# Patient Record
Sex: FEMALE | Race: WHITE | URBAN - METROPOLITAN AREA
[De-identification: names, ages, dates, MRNs, and addresses within clinical notes are randomized per-mention and may not be internally consistent; named-entity substitution may affect disease eponyms.]

---

## 2017-08-11 ENCOUNTER — DOCTOR'S OFFICE (OUTPATIENT)
Dept: URBAN - METROPOLITAN AREA CLINIC 125 | Facility: CLINIC | Age: 72
Setting detail: OPHTHALMOLOGY
End: 2017-08-11
Payer: COMMERCIAL

## 2017-08-11 DIAGNOSIS — E11.3413: ICD-10-CM

## 2017-08-11 PROCEDURE — 92134 CPTRZ OPH DX IMG PST SGM RTA: CPT | Performed by: OPHTHALMOLOGY

## 2017-08-11 PROCEDURE — 92014 COMPRE OPH EXAM EST PT 1/>: CPT | Performed by: OPHTHALMOLOGY

## 2017-08-11 ASSESSMENT — LID EXAM ASSESSMENTS
OS_COMMENTS: WOUND HEALED
OD_EDEMA: ABSENT

## 2017-08-11 ASSESSMENT — REFRACTION_MANIFEST
OD_VA3: 20/
OD_VA1: 20/
OD_VA2: 20/
OS_VA3: 20/
OS_VA2: 20/
OS_VA3: 20/
OD_VA1: 20/
OD_VA3: 20/
OS_VA2: 20/
OS_VA2: 20/
OS_VA3: 20/
OD_VA2: 20/
OS_VA1: 20/
OD_VA2: 20/
OD_VA3: 20/
OS_VA1: 20/
OD_VA1: 20/
OS_VA1: 20/
OU_VA: 20/

## 2017-08-11 ASSESSMENT — REFRACTION_CURRENTRX
OS_OVR_VA: 20/
OD_OVR_VA: 20/
OS_OVR_VA: 20/
OS_OVR_VA: 20/
OD_OVR_VA: 20/
OD_OVR_VA: 20/

## 2017-08-11 ASSESSMENT — REFRACTION_AUTOREFRACTION
OD_AXIS: 104
OS_CYLINDER: -2.00
OS_SPHERE: -3.00
OS_AXIS: 094
OD_SPHERE: -1.25
OD_CYLINDER: -2.25

## 2017-08-11 ASSESSMENT — SPHEQUIV_DERIVED
OD_SPHEQUIV: -2.375
OS_SPHEQUIV: -4

## 2017-08-11 ASSESSMENT — KERATOMETRY
OS_K1POWER_DIOPTERS: 43.75
OS_AXISANGLE_DEGREES: 103
OD_AXISANGLE_DEGREES: 106
OD_K1POWER_DIOPTERS: 43.00
OD_K2POWER_DIOPTERS: 45.00
OS_K2POWER_DIOPTERS: 45.00

## 2017-08-11 ASSESSMENT — SUPERFICIAL PUNCTATE KERATITIS (SPK)
OS_SPK: 1+
OD_SPK: 1+

## 2017-08-11 ASSESSMENT — CONFRONTATIONAL VISUAL FIELD TEST (CVF)
OD_FINDINGS: FULL
OS_FINDINGS: FULL

## 2017-08-11 ASSESSMENT — AXIALLENGTH_DERIVED
OD_AL: 24.3576
OS_AL: 24.8974

## 2017-08-11 ASSESSMENT — VISUAL ACUITY
OD_BCVA: 20/40+2
OS_BCVA: 20/50

## 2017-09-20 ENCOUNTER — DOCTOR'S OFFICE (OUTPATIENT)
Dept: URBAN - METROPOLITAN AREA CLINIC 125 | Facility: CLINIC | Age: 72
Setting detail: OPHTHALMOLOGY
End: 2017-09-20
Payer: COMMERCIAL

## 2017-09-20 DIAGNOSIS — H43.811: ICD-10-CM

## 2017-09-20 DIAGNOSIS — H04.123: ICD-10-CM

## 2017-09-20 DIAGNOSIS — E11.3413: ICD-10-CM

## 2017-09-20 DIAGNOSIS — H43.812: ICD-10-CM

## 2017-09-20 DIAGNOSIS — Z79.4: ICD-10-CM

## 2017-09-20 DIAGNOSIS — H10.13: ICD-10-CM

## 2017-09-20 DIAGNOSIS — H43.813: ICD-10-CM

## 2017-09-20 PROCEDURE — 99214 OFFICE O/P EST MOD 30 MIN: CPT | Performed by: OPHTHALMOLOGY

## 2017-09-20 PROCEDURE — 92134 CPTRZ OPH DX IMG PST SGM RTA: CPT | Performed by: OPHTHALMOLOGY

## 2017-09-20 PROCEDURE — 92225 OPHTHALMOSCOPY EXTENDED INITIAL: CPT | Performed by: OPHTHALMOLOGY

## 2017-09-20 ASSESSMENT — REFRACTION_MANIFEST
OU_VA: 20/
OS_VA2: 20/
OD_VA1: 20/
OS_VA1: 20/
OD_VA2: 20/
OS_VA1: 20/
OD_VA1: 20/
OD_VA2: 20/
OS_VA2: 20/
OU_VA: 20/
OD_VA2: 20/
OD_VA3: 20/
OD_VA1: 20/
OS_VA1: 20/
OS_VA3: 20/
OU_VA: 20/
OS_VA3: 20/
OD_VA3: 20/
OS_VA3: 20/
OS_VA2: 20/
OD_VA3: 20/

## 2017-09-20 ASSESSMENT — REFRACTION_CURRENTRX
OD_OVR_VA: 20/
OS_OVR_VA: 20/

## 2017-09-20 ASSESSMENT — LID EXAM ASSESSMENTS
OS_COMMENTS: WOUND HEALED
OD_EDEMA: ABSENT

## 2017-09-20 ASSESSMENT — SUPERFICIAL PUNCTATE KERATITIS (SPK)
OS_SPK: 1+
OD_SPK: 1+

## 2017-09-20 ASSESSMENT — REFRACTION_AUTOREFRACTION
OS_CYLINDER: -2.00
OS_AXIS: 094
OD_AXIS: 104
OD_CYLINDER: -2.25
OS_SPHERE: -3.00
OD_SPHERE: -1.25

## 2017-09-20 ASSESSMENT — SPHEQUIV_DERIVED
OD_SPHEQUIV: -2.375
OS_SPHEQUIV: -4

## 2017-09-20 ASSESSMENT — VISUAL ACUITY
OD_BCVA: 20/50+2
OS_BCVA: 20/30

## 2017-09-20 ASSESSMENT — CONFRONTATIONAL VISUAL FIELD TEST (CVF)
OD_FINDINGS: FULL
OS_FINDINGS: FULL

## 2017-11-29 ENCOUNTER — DOCTOR'S OFFICE (OUTPATIENT)
Dept: URBAN - METROPOLITAN AREA CLINIC 125 | Facility: CLINIC | Age: 72
Setting detail: OPHTHALMOLOGY
End: 2017-11-29
Payer: COMMERCIAL

## 2017-11-29 DIAGNOSIS — H43.811: ICD-10-CM

## 2017-11-29 DIAGNOSIS — E11.3413: ICD-10-CM

## 2017-11-29 DIAGNOSIS — Z79.4: ICD-10-CM

## 2017-11-29 DIAGNOSIS — H04.123: ICD-10-CM

## 2017-11-29 DIAGNOSIS — H43.812: ICD-10-CM

## 2017-11-29 DIAGNOSIS — H43.813: ICD-10-CM

## 2017-11-29 PROCEDURE — 92134 CPTRZ OPH DX IMG PST SGM RTA: CPT | Performed by: OPHTHALMOLOGY

## 2017-11-29 PROCEDURE — 99214 OFFICE O/P EST MOD 30 MIN: CPT | Performed by: OPHTHALMOLOGY

## 2017-11-29 PROCEDURE — 92226 OPHTHALMOSCOPY EXT SUBSEQUENT: CPT | Performed by: OPHTHALMOLOGY

## 2017-11-29 ASSESSMENT — REFRACTION_AUTOREFRACTION
OS_AXIS: 094
OS_CYLINDER: -2.00
OD_AXIS: 104
OD_CYLINDER: -2.25
OS_SPHERE: -3.00
OD_SPHERE: -1.25

## 2017-11-29 ASSESSMENT — REFRACTION_MANIFEST
OD_VA3: 20/
OU_VA: 20/
OD_VA2: 20/
OS_VA3: 20/
OU_VA: 20/
OS_VA1: 20/
OS_VA2: 20/
OS_VA3: 20/
OD_VA3: 20/
OS_VA2: 20/
OD_VA2: 20/
OS_VA3: 20/
OS_VA2: 20/
OS_VA1: 20/
OD_VA2: 20/
OD_VA1: 20/
OD_VA3: 20/
OU_VA: 20/
OS_VA1: 20/

## 2017-11-29 ASSESSMENT — LID EXAM ASSESSMENTS
OD_EDEMA: ABSENT
OS_COMMENTS: WOUND HEALED

## 2017-11-29 ASSESSMENT — SPHEQUIV_DERIVED
OS_SPHEQUIV: -4
OD_SPHEQUIV: -2.375

## 2017-11-29 ASSESSMENT — SUPERFICIAL PUNCTATE KERATITIS (SPK)
OD_SPK: 1+
OS_SPK: 1+

## 2017-11-29 ASSESSMENT — CONFRONTATIONAL VISUAL FIELD TEST (CVF)
OD_FINDINGS: FULL
OS_FINDINGS: FULL

## 2017-11-29 ASSESSMENT — REFRACTION_CURRENTRX
OD_OVR_VA: 20/
OS_OVR_VA: 20/

## 2017-11-29 ASSESSMENT — VISUAL ACUITY
OS_BCVA: 20/40+2
OD_BCVA: 20/50

## 2019-02-08 ENCOUNTER — DOCTOR'S OFFICE (OUTPATIENT)
Dept: URBAN - METROPOLITAN AREA CLINIC 125 | Facility: CLINIC | Age: 74
Setting detail: OPHTHALMOLOGY
End: 2019-02-08
Payer: COMMERCIAL

## 2019-02-08 DIAGNOSIS — H04.123: ICD-10-CM

## 2019-02-08 DIAGNOSIS — E11.3413: ICD-10-CM

## 2019-02-08 DIAGNOSIS — Z79.4: ICD-10-CM

## 2019-02-08 DIAGNOSIS — H43.813: ICD-10-CM

## 2019-02-08 DIAGNOSIS — H43.812: ICD-10-CM

## 2019-02-08 DIAGNOSIS — H43.811: ICD-10-CM

## 2019-02-08 PROCEDURE — 92134 CPTRZ OPH DX IMG PST SGM RTA: CPT | Performed by: OPHTHALMOLOGY

## 2019-02-08 PROCEDURE — 92014 COMPRE OPH EXAM EST PT 1/>: CPT | Performed by: OPHTHALMOLOGY

## 2019-02-08 PROCEDURE — 83861 MICROFLUID ANALY TEARS: CPT | Performed by: OPHTHALMOLOGY

## 2019-02-08 ASSESSMENT — REFRACTION_MANIFEST
OD_VA3: 20/
OS_VA3: 20/
OD_VA1: 20/
OD_VA2: 20/
OS_VA3: 20/
OS_VA2: 20/
OD_VA1: 20/
OU_VA: 20/
OS_VA1: 20/
OU_VA: 20/
OS_VA1: 20/
OS_VA2: 20/
OD_VA3: 20/
OD_VA2: 20/

## 2019-02-08 ASSESSMENT — REFRACTION_CURRENTRX
OD_CYLINDER: -2.25
OD_AXIS: 097
OS_OVR_VA: 20/
OD_OVR_VA: 20/
OD_VPRISM_DIRECTION: PROGS
OD_ADD: +2.50
OD_SPHERE: PLANO
OS_ADD: +2.50
OS_OVR_VA: 20/
OD_OVR_VA: 20/
OD_OVR_VA: 20/
OS_AXIS: 098
OS_OVR_VA: 20/
OS_SPHERE: -1.00
OS_CYLINDER: -2.50
OS_VPRISM_DIRECTION: PROGS

## 2019-02-08 ASSESSMENT — CONFRONTATIONAL VISUAL FIELD TEST (CVF)
OD_FINDINGS: FULL
OS_FINDINGS: FULL

## 2019-02-08 ASSESSMENT — SUPERFICIAL PUNCTATE KERATITIS (SPK)
OS_SPK: 1+
OD_SPK: 1+

## 2019-02-08 ASSESSMENT — VISUAL ACUITY
OS_BCVA: 20/30+2
OD_BCVA: 20/40-2

## 2019-02-08 ASSESSMENT — LID EXAM ASSESSMENTS
OS_COMMENTS: WOUND HEALED
OD_EDEMA: ABSENT

## 2019-02-08 ASSESSMENT — SPHEQUIV_DERIVED
OS_SPHEQUIV: -4
OD_SPHEQUIV: -2.375

## 2019-02-08 ASSESSMENT — REFRACTION_AUTOREFRACTION
OD_CYLINDER: -2.25
OS_CYLINDER: -2.00
OS_SPHERE: -3.00
OD_AXIS: 104
OD_SPHERE: -1.25
OS_AXIS: 094

## 2019-03-13 ENCOUNTER — DOCTOR'S OFFICE (OUTPATIENT)
Dept: URBAN - METROPOLITAN AREA CLINIC 125 | Facility: CLINIC | Age: 74
Setting detail: OPHTHALMOLOGY
End: 2019-03-13
Payer: COMMERCIAL

## 2019-03-13 DIAGNOSIS — Z79.4: ICD-10-CM

## 2019-03-13 DIAGNOSIS — H04.123: ICD-10-CM

## 2019-03-13 DIAGNOSIS — H43.812: ICD-10-CM

## 2019-03-13 DIAGNOSIS — H43.813: ICD-10-CM

## 2019-03-13 DIAGNOSIS — H43.811: ICD-10-CM

## 2019-03-13 DIAGNOSIS — E11.3413: ICD-10-CM

## 2019-03-13 PROCEDURE — 99214 OFFICE O/P EST MOD 30 MIN: CPT | Performed by: OPHTHALMOLOGY

## 2019-03-13 PROCEDURE — 92226 OPHTHALMOSCOPY EXT SUBSEQUENT: CPT | Performed by: OPHTHALMOLOGY

## 2019-03-13 PROCEDURE — 92235 FLUORESCEIN ANGRPH MLTIFRAME: CPT | Performed by: OPHTHALMOLOGY

## 2019-03-13 PROCEDURE — 92250 FUNDUS PHOTOGRAPHY W/I&R: CPT | Performed by: OPHTHALMOLOGY

## 2019-03-13 ASSESSMENT — REFRACTION_MANIFEST
OD_VA3: 20/
OS_VA1: 20/
OS_VA2: 20/
OS_VA2: 20/
OS_VA3: 20/
OS_VA3: 20/
OD_VA3: 20/
OU_VA: 20/
OD_VA2: 20/
OS_VA1: 20/
OD_VA2: 20/
OD_VA1: 20/
OU_VA: 20/
OD_VA1: 20/

## 2019-03-13 ASSESSMENT — REFRACTION_AUTOREFRACTION
OS_AXIS: 094
OS_SPHERE: -3.00
OD_CYLINDER: -2.25
OD_SPHERE: -1.25
OS_CYLINDER: -2.00
OD_AXIS: 104

## 2019-03-13 ASSESSMENT — CONFRONTATIONAL VISUAL FIELD TEST (CVF)
OS_FINDINGS: FULL
OD_FINDINGS: FULL

## 2019-03-13 ASSESSMENT — REFRACTION_CURRENTRX
OD_SPHERE: PLANO
OD_OVR_VA: 20/
OD_CYLINDER: -2.25
OS_SPHERE: -1.00
OS_OVR_VA: 20/
OS_OVR_VA: 20/
OS_CYLINDER: -2.50
OD_OVR_VA: 20/
OS_AXIS: 098
OD_ADD: +2.50
OD_OVR_VA: 20/
OD_AXIS: 097
OD_VPRISM_DIRECTION: PROGS
OS_OVR_VA: 20/
OS_VPRISM_DIRECTION: PROGS
OS_ADD: +2.50

## 2019-03-13 ASSESSMENT — VISUAL ACUITY
OD_BCVA: 20/40-2
OS_BCVA: 20/30+2

## 2019-03-13 ASSESSMENT — SUPERFICIAL PUNCTATE KERATITIS (SPK)
OD_SPK: 1+
OS_SPK: 1+

## 2019-03-13 ASSESSMENT — LID EXAM ASSESSMENTS
OD_EDEMA: ABSENT
OS_COMMENTS: WOUND HEALED

## 2019-03-13 ASSESSMENT — SPHEQUIV_DERIVED
OD_SPHEQUIV: -2.375
OS_SPHEQUIV: -4

## 2019-03-22 ENCOUNTER — DOCTOR'S OFFICE (OUTPATIENT)
Dept: URBAN - METROPOLITAN AREA CLINIC 125 | Facility: CLINIC | Age: 74
Setting detail: OPHTHALMOLOGY
End: 2019-03-22
Payer: COMMERCIAL

## 2019-03-22 DIAGNOSIS — Z79.4: ICD-10-CM

## 2019-03-22 DIAGNOSIS — E11.3411: ICD-10-CM

## 2019-03-22 PROCEDURE — 67028 INJECTION EYE DRUG: CPT | Performed by: OPHTHALMOLOGY

## 2019-03-22 ASSESSMENT — REFRACTION_AUTOREFRACTION
OD_AXIS: 104
OD_SPHERE: -1.25
OS_SPHERE: -3.00
OD_CYLINDER: -2.25
OS_AXIS: 094
OS_CYLINDER: -2.00

## 2019-03-22 ASSESSMENT — VISUAL ACUITY
OS_BCVA: 20/30+2
OD_BCVA: 20/40-2

## 2019-03-22 ASSESSMENT — SPHEQUIV_DERIVED
OD_SPHEQUIV: -2.375
OS_SPHEQUIV: -4

## 2019-03-27 ENCOUNTER — DOCTOR'S OFFICE (OUTPATIENT)
Dept: URBAN - METROPOLITAN AREA CLINIC 125 | Facility: CLINIC | Age: 74
Setting detail: OPHTHALMOLOGY
End: 2019-03-27
Payer: COMMERCIAL

## 2019-03-27 DIAGNOSIS — E11.3412: ICD-10-CM

## 2019-03-27 PROCEDURE — 67028 INJECTION EYE DRUG: CPT | Performed by: OPHTHALMOLOGY

## 2019-03-27 ASSESSMENT — REFRACTION_AUTOREFRACTION
OS_AXIS: 094
OS_SPHERE: -3.00
OD_SPHERE: -1.25
OS_CYLINDER: -2.00
OD_CYLINDER: -2.25
OD_AXIS: 104

## 2019-03-27 ASSESSMENT — REFRACTION_MANIFEST
OU_VA: 20/
OS_VA2: 20/
OD_VA3: 20/
OD_VA3: 20/
OS_VA3: 20/
OS_VA3: 20/
OS_VA1: 20/
OD_VA1: 20/
OS_VA1: 20/
OD_VA1: 20/
OD_VA2: 20/
OU_VA: 20/
OS_VA2: 20/
OD_VA2: 20/

## 2019-03-27 ASSESSMENT — REFRACTION_CURRENTRX
OS_OVR_VA: 20/
OD_OVR_VA: 20/
OD_OVR_VA: 20/
OS_OVR_VA: 20/
OD_OVR_VA: 20/
OS_OVR_VA: 20/

## 2019-03-27 ASSESSMENT — SPHEQUIV_DERIVED
OD_SPHEQUIV: -2.375
OS_SPHEQUIV: -4

## 2019-03-27 ASSESSMENT — VISUAL ACUITY
OS_BCVA: 20/30+2
OD_BCVA: 20/40-2

## 2019-04-17 ENCOUNTER — DOCTOR'S OFFICE (OUTPATIENT)
Dept: URBAN - METROPOLITAN AREA CLINIC 125 | Facility: CLINIC | Age: 74
Setting detail: OPHTHALMOLOGY
End: 2019-04-17
Payer: COMMERCIAL

## 2019-04-17 DIAGNOSIS — Z79.4: ICD-10-CM

## 2019-04-17 DIAGNOSIS — E11.3413: ICD-10-CM

## 2019-04-17 DIAGNOSIS — H43.813: ICD-10-CM

## 2019-04-17 DIAGNOSIS — H43.811: ICD-10-CM

## 2019-04-17 DIAGNOSIS — H43.812: ICD-10-CM

## 2019-04-17 PROCEDURE — 92134 CPTRZ OPH DX IMG PST SGM RTA: CPT | Performed by: OPHTHALMOLOGY

## 2019-04-17 PROCEDURE — 92226 OPHTHALMOSCOPY EXT SUBSEQUENT: CPT | Performed by: OPHTHALMOLOGY

## 2019-04-17 PROCEDURE — 99214 OFFICE O/P EST MOD 30 MIN: CPT | Performed by: OPHTHALMOLOGY

## 2019-04-17 ASSESSMENT — LID EXAM ASSESSMENTS
OD_EDEMA: ABSENT
OS_COMMENTS: WOUND HEALED

## 2019-04-17 ASSESSMENT — REFRACTION_AUTOREFRACTION
OD_SPHERE: -1.25
OS_SPHERE: -3.00
OD_CYLINDER: -2.25
OS_AXIS: 094
OS_CYLINDER: -2.00
OD_AXIS: 104

## 2019-04-17 ASSESSMENT — REFRACTION_CURRENTRX
OD_OVR_VA: 20/
OS_OVR_VA: 20/
OD_OVR_VA: 20/
OD_OVR_VA: 20/
OS_OVR_VA: 20/
OS_OVR_VA: 20/

## 2019-04-17 ASSESSMENT — REFRACTION_MANIFEST
OD_VA3: 20/
OD_VA1: 20/
OS_VA2: 20/
OS_VA1: 20/
OD_VA2: 20/
OD_VA3: 20/
OU_VA: 20/
OS_VA1: 20/
OS_VA3: 20/
OD_VA2: 20/
OD_VA1: 20/
OS_VA3: 20/
OU_VA: 20/
OS_VA2: 20/

## 2019-04-17 ASSESSMENT — VISUAL ACUITY
OD_BCVA: 20/40
OS_BCVA: 20/25-2

## 2019-04-17 ASSESSMENT — CONFRONTATIONAL VISUAL FIELD TEST (CVF)
OD_FINDINGS: FULL
OS_FINDINGS: FULL

## 2019-04-17 ASSESSMENT — SUPERFICIAL PUNCTATE KERATITIS (SPK)
OD_SPK: 1+
OS_SPK: 1+

## 2019-04-17 ASSESSMENT — SPHEQUIV_DERIVED
OD_SPHEQUIV: -2.375
OS_SPHEQUIV: -4

## 2019-04-24 ENCOUNTER — DOCTOR'S OFFICE (OUTPATIENT)
Dept: URBAN - METROPOLITAN AREA CLINIC 125 | Facility: CLINIC | Age: 74
Setting detail: OPHTHALMOLOGY
End: 2019-04-24
Payer: COMMERCIAL

## 2019-04-24 DIAGNOSIS — E11.3411: ICD-10-CM

## 2019-04-24 PROCEDURE — 67028 INJECTION EYE DRUG: CPT | Performed by: OPHTHALMOLOGY

## 2019-04-24 ASSESSMENT — REFRACTION_MANIFEST
OD_VA3: 20/
OD_VA3: 20/
OS_VA2: 20/
OS_VA2: 20/
OD_VA1: 20/
OS_VA1: 20/
OS_VA3: 20/
OD_VA2: 20/
OS_VA3: 20/
OD_VA2: 20/
OS_VA1: 20/
OU_VA: 20/
OD_VA1: 20/
OU_VA: 20/

## 2019-04-24 ASSESSMENT — VISUAL ACUITY
OD_BCVA: 20/40
OS_BCVA: 20/25-2

## 2019-04-24 ASSESSMENT — REFRACTION_AUTOREFRACTION
OS_CYLINDER: -2.00
OS_SPHERE: -3.00
OD_SPHERE: -1.25
OD_AXIS: 104
OD_CYLINDER: -2.25
OS_AXIS: 094

## 2019-04-24 ASSESSMENT — REFRACTION_CURRENTRX
OD_OVR_VA: 20/
OD_OVR_VA: 20/
OS_OVR_VA: 20/
OD_OVR_VA: 20/
OS_OVR_VA: 20/
OS_OVR_VA: 20/

## 2019-04-24 ASSESSMENT — SPHEQUIV_DERIVED
OD_SPHEQUIV: -2.375
OS_SPHEQUIV: -4

## 2019-04-26 ENCOUNTER — DOCTOR'S OFFICE (OUTPATIENT)
Dept: URBAN - METROPOLITAN AREA CLINIC 125 | Facility: CLINIC | Age: 74
Setting detail: OPHTHALMOLOGY
End: 2019-04-26
Payer: COMMERCIAL

## 2019-04-26 DIAGNOSIS — E11.3412: ICD-10-CM

## 2019-04-26 PROCEDURE — 67028 INJECTION EYE DRUG: CPT | Performed by: OPHTHALMOLOGY

## 2019-04-26 ASSESSMENT — REFRACTION_AUTOREFRACTION
OS_CYLINDER: -2.00
OD_AXIS: 104
OD_CYLINDER: -2.25
OS_AXIS: 094
OD_SPHERE: -1.25
OS_SPHERE: -3.00

## 2019-04-26 ASSESSMENT — REFRACTION_MANIFEST
OS_VA2: 20/
OS_VA3: 20/
OD_VA3: 20/
OD_VA1: 20/
OU_VA: 20/
OD_VA1: 20/
OU_VA: 20/
OS_VA2: 20/
OD_VA2: 20/
OD_VA2: 20/
OS_VA3: 20/
OD_VA3: 20/
OS_VA1: 20/
OS_VA1: 20/

## 2019-04-26 ASSESSMENT — REFRACTION_CURRENTRX
OS_OVR_VA: 20/
OD_OVR_VA: 20/
OD_OVR_VA: 20/
OS_OVR_VA: 20/
OD_OVR_VA: 20/
OS_OVR_VA: 20/

## 2019-04-26 ASSESSMENT — VISUAL ACUITY
OS_BCVA: 20/25-2
OD_BCVA: 20/40

## 2019-04-26 ASSESSMENT — SPHEQUIV_DERIVED
OS_SPHEQUIV: -4
OD_SPHEQUIV: -2.375

## 2019-05-15 ENCOUNTER — DOCTOR'S OFFICE (OUTPATIENT)
Dept: URBAN - METROPOLITAN AREA CLINIC 125 | Facility: CLINIC | Age: 74
Setting detail: OPHTHALMOLOGY
End: 2019-05-15
Payer: COMMERCIAL

## 2019-05-15 DIAGNOSIS — H04.123: ICD-10-CM

## 2019-05-15 DIAGNOSIS — H04.121: ICD-10-CM

## 2019-05-15 DIAGNOSIS — H43.811: ICD-10-CM

## 2019-05-15 DIAGNOSIS — H43.813: ICD-10-CM

## 2019-05-15 DIAGNOSIS — Z79.4: ICD-10-CM

## 2019-05-15 DIAGNOSIS — H04.122: ICD-10-CM

## 2019-05-15 DIAGNOSIS — E11.3413: ICD-10-CM

## 2019-05-15 DIAGNOSIS — H43.812: ICD-10-CM

## 2019-05-15 PROCEDURE — 92014 COMPRE OPH EXAM EST PT 1/>: CPT | Performed by: OPHTHALMOLOGY

## 2019-05-15 PROCEDURE — 83861 MICROFLUID ANALY TEARS: CPT | Performed by: OPHTHALMOLOGY

## 2019-05-15 PROCEDURE — 92134 CPTRZ OPH DX IMG PST SGM RTA: CPT | Performed by: OPHTHALMOLOGY

## 2019-05-15 PROCEDURE — 92226 OPHTHALMOSCOPY EXT SUBSEQUENT: CPT | Performed by: OPHTHALMOLOGY

## 2019-05-15 ASSESSMENT — LID EXAM ASSESSMENTS
OS_COMMENTS: WOUND HEALED
OD_EDEMA: ABSENT

## 2019-05-15 ASSESSMENT — REFRACTION_MANIFEST
OU_VA: 20/
OD_VA2: 20/
OD_VA3: 20/
OD_VA3: 20/
OD_VA1: 20/
OS_VA3: 20/
OS_VA1: 20/
OD_VA1: 20/
OS_VA2: 20/
OS_VA3: 20/
OU_VA: 20/
OS_VA1: 20/
OS_VA2: 20/
OD_VA2: 20/

## 2019-05-15 ASSESSMENT — REFRACTION_CURRENTRX
OD_OVR_VA: 20/
OS_OVR_VA: 20/
OD_OVR_VA: 20/
OD_OVR_VA: 20/

## 2019-05-15 ASSESSMENT — REFRACTION_AUTOREFRACTION
OD_SPHERE: -1.25
OS_SPHERE: -3.00
OD_AXIS: 104
OD_CYLINDER: -2.25
OS_AXIS: 094
OS_CYLINDER: -2.00

## 2019-05-15 ASSESSMENT — SUPERFICIAL PUNCTATE KERATITIS (SPK)
OD_SPK: 1+
OS_SPK: 1+

## 2019-05-15 ASSESSMENT — CONFRONTATIONAL VISUAL FIELD TEST (CVF)
OD_FINDINGS: FULL
OS_FINDINGS: FULL

## 2019-05-15 ASSESSMENT — SPHEQUIV_DERIVED
OD_SPHEQUIV: -2.375
OS_SPHEQUIV: -4

## 2019-05-15 ASSESSMENT — VISUAL ACUITY
OS_BCVA: 20/30+1
OD_BCVA: 20/25-2

## 2019-05-29 ENCOUNTER — DOCTOR'S OFFICE (OUTPATIENT)
Dept: URBAN - METROPOLITAN AREA CLINIC 125 | Facility: CLINIC | Age: 74
Setting detail: OPHTHALMOLOGY
End: 2019-05-29
Payer: COMMERCIAL

## 2019-05-29 DIAGNOSIS — E11.3411: ICD-10-CM

## 2019-05-29 PROCEDURE — 67028 INJECTION EYE DRUG: CPT | Performed by: OPHTHALMOLOGY

## 2019-05-29 ASSESSMENT — VISUAL ACUITY
OS_BCVA: 20/30+1
OD_BCVA: 20/25-2

## 2019-05-29 ASSESSMENT — REFRACTION_MANIFEST
OD_VA1: 20/
OS_VA3: 20/
OD_VA3: 20/
OS_VA1: 20/
OS_VA3: 20/
OD_VA2: 20/
OD_VA2: 20/
OU_VA: 20/
OS_VA2: 20/
OS_VA2: 20/
OD_VA3: 20/
OU_VA: 20/
OS_VA1: 20/
OD_VA1: 20/

## 2019-05-29 ASSESSMENT — REFRACTION_CURRENTRX
OD_OVR_VA: 20/
OS_OVR_VA: 20/
OD_OVR_VA: 20/
OD_OVR_VA: 20/

## 2019-05-29 ASSESSMENT — REFRACTION_AUTOREFRACTION
OS_CYLINDER: -2.00
OS_AXIS: 094
OD_CYLINDER: -2.25
OD_SPHERE: -1.25
OD_AXIS: 104
OS_SPHERE: -3.00

## 2019-05-29 ASSESSMENT — SPHEQUIV_DERIVED
OD_SPHEQUIV: -2.375
OS_SPHEQUIV: -4

## 2019-06-05 ENCOUNTER — DOCTOR'S OFFICE (OUTPATIENT)
Dept: URBAN - METROPOLITAN AREA CLINIC 125 | Facility: CLINIC | Age: 74
Setting detail: OPHTHALMOLOGY
End: 2019-06-05
Payer: COMMERCIAL

## 2019-06-05 DIAGNOSIS — E11.3412: ICD-10-CM

## 2019-06-05 PROCEDURE — 67028 INJECTION EYE DRUG: CPT | Performed by: OPHTHALMOLOGY

## 2019-06-05 ASSESSMENT — REFRACTION_MANIFEST
OS_VA1: 20/
OD_VA1: 20/
OD_VA2: 20/
OS_VA3: 20/
OU_VA: 20/
OS_VA3: 20/
OS_VA2: 20/
OD_VA3: 20/
OD_VA2: 20/
OD_VA1: 20/
OU_VA: 20/
OS_VA2: 20/
OS_VA1: 20/
OD_VA3: 20/

## 2019-06-05 ASSESSMENT — REFRACTION_CURRENTRX
OS_OVR_VA: 20/
OD_OVR_VA: 20/
OS_OVR_VA: 20/
OD_OVR_VA: 20/
OD_OVR_VA: 20/
OS_OVR_VA: 20/

## 2019-06-05 ASSESSMENT — REFRACTION_AUTOREFRACTION
OS_CYLINDER: -2.00
OD_SPHERE: -1.25
OD_CYLINDER: -2.25
OS_AXIS: 094
OD_AXIS: 104
OS_SPHERE: -3.00

## 2019-06-05 ASSESSMENT — VISUAL ACUITY
OS_BCVA: 20/30+1
OD_BCVA: 20/25-2

## 2019-06-05 ASSESSMENT — SPHEQUIV_DERIVED
OD_SPHEQUIV: -2.375
OS_SPHEQUIV: -4

## 2019-06-19 ENCOUNTER — DOCTOR'S OFFICE (OUTPATIENT)
Dept: URBAN - METROPOLITAN AREA CLINIC 125 | Facility: CLINIC | Age: 74
Setting detail: OPHTHALMOLOGY
End: 2019-06-19
Payer: COMMERCIAL

## 2019-06-19 DIAGNOSIS — E11.3493: ICD-10-CM

## 2019-06-19 DIAGNOSIS — H04.123: ICD-10-CM

## 2019-06-19 DIAGNOSIS — H43.813: ICD-10-CM

## 2019-06-19 DIAGNOSIS — H43.812: ICD-10-CM

## 2019-06-19 DIAGNOSIS — H43.811: ICD-10-CM

## 2019-06-19 PROCEDURE — 92226 OPHTHALMOSCOPY EXT SUBSEQUENT: CPT | Performed by: OPHTHALMOLOGY

## 2019-06-19 PROCEDURE — 92134 CPTRZ OPH DX IMG PST SGM RTA: CPT | Performed by: OPHTHALMOLOGY

## 2019-06-19 PROCEDURE — 92014 COMPRE OPH EXAM EST PT 1/>: CPT | Performed by: OPHTHALMOLOGY

## 2019-06-19 ASSESSMENT — REFRACTION_AUTOREFRACTION
OS_SPHERE: -3.00
OD_CYLINDER: -2.25
OS_AXIS: 094
OD_SPHERE: -1.25
OD_AXIS: 104
OS_CYLINDER: -2.00

## 2019-06-19 ASSESSMENT — SPHEQUIV_DERIVED
OD_SPHEQUIV: -2.375
OS_SPHEQUIV: -4

## 2019-06-19 ASSESSMENT — REFRACTION_MANIFEST
OD_VA1: 20/
OD_VA3: 20/
OS_VA3: 20/
OD_VA2: 20/
OD_VA2: 20/
OD_VA1: 20/
OU_VA: 20/
OS_VA2: 20/
OD_VA3: 20/
OS_VA1: 20/
OS_VA3: 20/
OU_VA: 20/
OS_VA2: 20/
OS_VA1: 20/

## 2019-06-19 ASSESSMENT — LID EXAM ASSESSMENTS
OD_EDEMA: ABSENT
OS_COMMENTS: WOUND HEALED

## 2019-06-19 ASSESSMENT — REFRACTION_CURRENTRX
OS_OVR_VA: 20/
OD_OVR_VA: 20/
OS_OVR_VA: 20/
OD_OVR_VA: 20/
OS_OVR_VA: 20/
OD_OVR_VA: 20/

## 2019-06-19 ASSESSMENT — VISUAL ACUITY
OS_BCVA: 20/25
OD_BCVA: 20/25-1

## 2019-06-19 ASSESSMENT — SUPERFICIAL PUNCTATE KERATITIS (SPK)
OD_SPK: 1+
OS_SPK: 1+

## 2019-06-19 ASSESSMENT — CONFRONTATIONAL VISUAL FIELD TEST (CVF)
OD_FINDINGS: FULL
OS_FINDINGS: FULL

## 2019-07-12 ENCOUNTER — DOCTOR'S OFFICE (OUTPATIENT)
Dept: URBAN - METROPOLITAN AREA CLINIC 125 | Facility: CLINIC | Age: 74
Setting detail: OPHTHALMOLOGY
End: 2019-07-12
Payer: COMMERCIAL

## 2019-07-12 DIAGNOSIS — E11.3491: ICD-10-CM

## 2019-07-12 PROCEDURE — 67028 INJECTION EYE DRUG: CPT | Performed by: OPHTHALMOLOGY

## 2019-07-12 ASSESSMENT — REFRACTION_MANIFEST
OS_VA2: 20/
OU_VA: 20/
OD_VA1: 20/
OD_VA3: 20/
OS_VA3: 20/
OS_VA3: 20/
OU_VA: 20/
OS_VA2: 20/
OS_VA1: 20/
OD_VA3: 20/
OD_VA1: 20/
OD_VA2: 20/
OD_VA2: 20/
OS_VA1: 20/

## 2019-07-12 ASSESSMENT — REFRACTION_CURRENTRX
OS_OVR_VA: 20/
OD_OVR_VA: 20/
OS_OVR_VA: 20/
OD_OVR_VA: 20/
OD_OVR_VA: 20/
OS_OVR_VA: 20/

## 2019-07-12 ASSESSMENT — REFRACTION_AUTOREFRACTION
OD_SPHERE: -1.25
OS_SPHERE: -3.00
OD_AXIS: 104
OD_CYLINDER: -2.25
OS_AXIS: 094
OS_CYLINDER: -2.00

## 2019-07-12 ASSESSMENT — VISUAL ACUITY
OS_BCVA: 20/25
OD_BCVA: 20/25-1

## 2019-07-12 ASSESSMENT — SPHEQUIV_DERIVED
OD_SPHEQUIV: -2.375
OS_SPHEQUIV: -4

## 2019-07-17 ENCOUNTER — DOCTOR'S OFFICE (OUTPATIENT)
Dept: URBAN - METROPOLITAN AREA CLINIC 125 | Facility: CLINIC | Age: 74
Setting detail: OPHTHALMOLOGY
End: 2019-07-17
Payer: COMMERCIAL

## 2019-07-17 DIAGNOSIS — E11.3492: ICD-10-CM

## 2019-07-17 PROCEDURE — 67028 INJECTION EYE DRUG: CPT | Performed by: OPHTHALMOLOGY

## 2019-07-17 ASSESSMENT — REFRACTION_MANIFEST
OS_VA1: 20/
OD_VA3: 20/
OS_VA2: 20/
OD_VA2: 20/
OS_VA3: 20/
OD_VA1: 20/
OS_VA1: 20/
OS_VA3: 20/
OU_VA: 20/
OD_VA3: 20/
OD_VA2: 20/
OD_VA1: 20/
OU_VA: 20/
OS_VA2: 20/

## 2019-07-17 ASSESSMENT — REFRACTION_CURRENTRX
OD_OVR_VA: 20/
OS_OVR_VA: 20/
OD_OVR_VA: 20/
OS_OVR_VA: 20/
OS_OVR_VA: 20/
OD_OVR_VA: 20/

## 2019-07-17 ASSESSMENT — VISUAL ACUITY
OS_BCVA: 20/25
OD_BCVA: 20/25-1

## 2019-07-17 ASSESSMENT — REFRACTION_AUTOREFRACTION
OD_AXIS: 104
OD_SPHERE: -1.25
OD_CYLINDER: -2.25
OS_CYLINDER: -2.00
OS_AXIS: 094
OS_SPHERE: -3.00

## 2019-07-17 ASSESSMENT — SPHEQUIV_DERIVED
OD_SPHEQUIV: -2.375
OS_SPHEQUIV: -4

## 2019-07-24 ENCOUNTER — DOCTOR'S OFFICE (OUTPATIENT)
Dept: URBAN - METROPOLITAN AREA CLINIC 125 | Facility: CLINIC | Age: 74
Setting detail: OPHTHALMOLOGY
End: 2019-07-24
Payer: COMMERCIAL

## 2019-07-24 DIAGNOSIS — H04.123: ICD-10-CM

## 2019-07-24 DIAGNOSIS — E11.3413: ICD-10-CM

## 2019-07-24 DIAGNOSIS — H43.812: ICD-10-CM

## 2019-07-24 DIAGNOSIS — Z79.4: ICD-10-CM

## 2019-07-24 DIAGNOSIS — H43.811: ICD-10-CM

## 2019-07-24 DIAGNOSIS — H43.813: ICD-10-CM

## 2019-07-24 PROBLEM — H10.13 ALLERGIC CONJUNCTIVITIS; BOTH EYES: Status: RESOLVED | Noted: 2017-08-11 | Resolved: 2019-07-24

## 2019-07-24 PROCEDURE — 92012 INTRM OPH EXAM EST PATIENT: CPT | Performed by: OPHTHALMOLOGY

## 2019-07-24 PROCEDURE — 92250 FUNDUS PHOTOGRAPHY W/I&R: CPT | Performed by: OPHTHALMOLOGY

## 2019-07-24 PROCEDURE — 92226 OPHTHALMOSCOPY EXT SUBSEQUENT: CPT | Performed by: OPHTHALMOLOGY

## 2019-07-24 PROCEDURE — 92235 FLUORESCEIN ANGRPH MLTIFRAME: CPT | Performed by: OPHTHALMOLOGY

## 2019-07-24 ASSESSMENT — SUPERFICIAL PUNCTATE KERATITIS (SPK)
OD_SPK: 1+
OS_SPK: 1+

## 2019-07-24 ASSESSMENT — REFRACTION_MANIFEST
OS_VA2: 20/
OU_VA: 20/
OD_VA3: 20/
OS_VA1: 20/
OS_VA3: 20/
OD_VA1: 20/
OD_VA1: 20/
OS_VA2: 20/
OS_VA1: 20/
OD_VA2: 20/
OD_VA3: 20/
OS_VA3: 20/
OD_VA2: 20/
OU_VA: 20/

## 2019-07-24 ASSESSMENT — VISUAL ACUITY
OS_BCVA: 20/30
OD_BCVA: 20/25

## 2019-07-24 ASSESSMENT — REFRACTION_CURRENTRX
OD_OVR_VA: 20/
OS_OVR_VA: 20/
OD_OVR_VA: 20/
OS_OVR_VA: 20/
OS_OVR_VA: 20/
OD_OVR_VA: 20/

## 2019-07-24 ASSESSMENT — LID EXAM ASSESSMENTS
OD_EDEMA: ABSENT
OS_COMMENTS: WOUND HEALED

## 2019-07-24 ASSESSMENT — REFRACTION_AUTOREFRACTION
OD_SPHERE: -1.25
OD_AXIS: 104
OS_SPHERE: -3.00
OS_CYLINDER: -2.00
OD_CYLINDER: -2.25
OS_AXIS: 094

## 2019-07-24 ASSESSMENT — SPHEQUIV_DERIVED
OS_SPHEQUIV: -4
OD_SPHEQUIV: -2.375

## 2019-08-14 ENCOUNTER — DOCTOR'S OFFICE (OUTPATIENT)
Dept: URBAN - METROPOLITAN AREA CLINIC 125 | Facility: CLINIC | Age: 74
Setting detail: OPHTHALMOLOGY
End: 2019-08-14
Payer: COMMERCIAL

## 2019-08-14 DIAGNOSIS — E11.3411: ICD-10-CM

## 2019-08-14 PROCEDURE — 67028 INJECTION EYE DRUG: CPT | Performed by: OPHTHALMOLOGY

## 2019-08-14 ASSESSMENT — REFRACTION_MANIFEST
OD_VA1: 20/
OS_VA2: 20/
OD_VA3: 20/
OD_VA2: 20/
OD_VA2: 20/
OS_VA3: 20/
OS_VA2: 20/
OU_VA: 20/
OS_VA1: 20/
OD_VA3: 20/
OS_VA1: 20/
OS_VA3: 20/
OD_VA1: 20/
OU_VA: 20/

## 2019-08-14 ASSESSMENT — REFRACTION_CURRENTRX
OD_OVR_VA: 20/
OS_OVR_VA: 20/
OS_OVR_VA: 20/
OD_OVR_VA: 20/
OD_OVR_VA: 20/
OS_OVR_VA: 20/

## 2019-08-14 ASSESSMENT — SPHEQUIV_DERIVED
OS_SPHEQUIV: -4
OD_SPHEQUIV: -2.375

## 2019-08-14 ASSESSMENT — REFRACTION_AUTOREFRACTION
OS_AXIS: 094
OS_SPHERE: -3.00
OS_CYLINDER: -2.00
OD_SPHERE: -1.25
OD_AXIS: 104
OD_CYLINDER: -2.25

## 2019-08-14 ASSESSMENT — VISUAL ACUITY
OD_BCVA: 20/25
OS_BCVA: 20/30

## 2019-08-21 ENCOUNTER — DOCTOR'S OFFICE (OUTPATIENT)
Dept: URBAN - METROPOLITAN AREA CLINIC 125 | Facility: CLINIC | Age: 74
Setting detail: OPHTHALMOLOGY
End: 2019-08-21
Payer: COMMERCIAL

## 2019-08-21 DIAGNOSIS — E11.3412: ICD-10-CM

## 2019-08-21 PROCEDURE — 67028 INJECTION EYE DRUG: CPT | Performed by: OPHTHALMOLOGY

## 2019-08-21 ASSESSMENT — REFRACTION_MANIFEST
OS_VA2: 20/
OD_VA1: 20/
OS_VA1: 20/
OS_VA1: 20/
OU_VA: 20/
OD_VA3: 20/
OD_VA1: 20/
OU_VA: 20/
OS_VA2: 20/
OD_VA2: 20/
OD_VA2: 20/
OS_VA3: 20/
OS_VA3: 20/
OD_VA3: 20/

## 2019-08-21 ASSESSMENT — REFRACTION_AUTOREFRACTION
OD_CYLINDER: -2.25
OS_SPHERE: -3.00
OD_AXIS: 104
OS_CYLINDER: -2.00
OD_SPHERE: -1.25
OS_AXIS: 094

## 2019-08-21 ASSESSMENT — SPHEQUIV_DERIVED
OD_SPHEQUIV: -2.375
OS_SPHEQUIV: -4

## 2019-08-21 ASSESSMENT — REFRACTION_CURRENTRX
OS_OVR_VA: 20/
OD_OVR_VA: 20/
OS_OVR_VA: 20/
OD_OVR_VA: 20/
OS_OVR_VA: 20/
OD_OVR_VA: 20/

## 2019-08-21 ASSESSMENT — VISUAL ACUITY
OD_BCVA: 20/25
OS_BCVA: 20/30

## 2019-09-11 ENCOUNTER — DOCTOR'S OFFICE (OUTPATIENT)
Dept: URBAN - METROPOLITAN AREA CLINIC 125 | Facility: CLINIC | Age: 74
Setting detail: OPHTHALMOLOGY
End: 2019-09-11
Payer: COMMERCIAL

## 2019-09-11 DIAGNOSIS — H43.811: ICD-10-CM

## 2019-09-11 DIAGNOSIS — H43.813: ICD-10-CM

## 2019-09-11 DIAGNOSIS — E11.3413: ICD-10-CM

## 2019-09-11 DIAGNOSIS — Z79.4: ICD-10-CM

## 2019-09-11 DIAGNOSIS — H04.122: ICD-10-CM

## 2019-09-11 DIAGNOSIS — H43.812: ICD-10-CM

## 2019-09-11 DIAGNOSIS — H04.121: ICD-10-CM

## 2019-09-11 PROCEDURE — 83861 MICROFLUID ANALY TEARS: CPT | Performed by: OPHTHALMOLOGY

## 2019-09-11 PROCEDURE — 92014 COMPRE OPH EXAM EST PT 1/>: CPT | Performed by: OPHTHALMOLOGY

## 2019-09-11 PROCEDURE — 92134 CPTRZ OPH DX IMG PST SGM RTA: CPT | Performed by: OPHTHALMOLOGY

## 2019-09-11 PROCEDURE — 92226 OPHTHALMOSCOPY EXT SUBSEQUENT: CPT | Performed by: OPHTHALMOLOGY

## 2019-09-11 ASSESSMENT — REFRACTION_MANIFEST
OU_VA: 20/
OS_VA2: 20/
OD_VA1: 20/
OD_VA2: 20/
OS_VA3: 20/
OS_VA1: 20/
OS_VA3: 20/
OD_VA3: 20/
OS_VA1: 20/
OD_VA1: 20/
OD_VA2: 20/
OD_VA3: 20/
OU_VA: 20/
OS_VA2: 20/

## 2019-09-11 ASSESSMENT — LID EXAM ASSESSMENTS
OD_EDEMA: ABSENT
OS_COMMENTS: WOUND HEALED

## 2019-09-11 ASSESSMENT — SPHEQUIV_DERIVED
OD_SPHEQUIV: -2.375
OS_SPHEQUIV: -4

## 2019-09-11 ASSESSMENT — REFRACTION_AUTOREFRACTION
OD_CYLINDER: -2.25
OD_SPHERE: -1.25
OS_AXIS: 094
OS_SPHERE: -3.00
OD_AXIS: 104
OS_CYLINDER: -2.00

## 2019-09-11 ASSESSMENT — REFRACTION_CURRENTRX
OS_OVR_VA: 20/
OD_OVR_VA: 20/
OD_OVR_VA: 20/
OS_OVR_VA: 20/
OS_OVR_VA: 20/
OD_OVR_VA: 20/

## 2019-09-11 ASSESSMENT — CONFRONTATIONAL VISUAL FIELD TEST (CVF)
OS_FINDINGS: FULL
OD_FINDINGS: FULL

## 2019-09-11 ASSESSMENT — SUPERFICIAL PUNCTATE KERATITIS (SPK)
OS_SPK: 1+
OD_SPK: 1+

## 2019-09-11 ASSESSMENT — VISUAL ACUITY
OS_BCVA: 20/30-1
OD_BCVA: 20/30-1

## 2019-09-25 ENCOUNTER — DOCTOR'S OFFICE (OUTPATIENT)
Dept: URBAN - METROPOLITAN AREA CLINIC 125 | Facility: CLINIC | Age: 74
Setting detail: OPHTHALMOLOGY
End: 2019-09-25
Payer: COMMERCIAL

## 2019-09-25 DIAGNOSIS — E11.3411: ICD-10-CM

## 2019-09-25 PROCEDURE — 67028 INJECTION EYE DRUG: CPT | Performed by: OPHTHALMOLOGY

## 2019-09-25 ASSESSMENT — REFRACTION_CURRENTRX
OD_OVR_VA: 20/
OD_OVR_VA: 20/
OS_OVR_VA: 20/
OD_OVR_VA: 20/

## 2019-09-25 ASSESSMENT — REFRACTION_MANIFEST
OD_VA2: 20/
OS_VA3: 20/
OD_VA1: 20/
OS_VA1: 20/
OD_VA1: 20/
OS_VA1: 20/
OD_VA3: 20/
OS_VA2: 20/
OU_VA: 20/
OD_VA2: 20/
OS_VA2: 20/
OD_VA3: 20/
OS_VA3: 20/
OU_VA: 20/

## 2019-09-25 ASSESSMENT — SPHEQUIV_DERIVED
OD_SPHEQUIV: -2.375
OS_SPHEQUIV: -4

## 2019-09-25 ASSESSMENT — VISUAL ACUITY
OS_BCVA: 20/30-1
OD_BCVA: 20/30-1

## 2019-09-25 ASSESSMENT — REFRACTION_AUTOREFRACTION
OS_AXIS: 094
OD_SPHERE: -1.25
OS_CYLINDER: -2.00
OD_CYLINDER: -2.25
OS_SPHERE: -3.00
OD_AXIS: 104

## 2019-09-27 ENCOUNTER — DOCTOR'S OFFICE (OUTPATIENT)
Dept: URBAN - METROPOLITAN AREA CLINIC 125 | Facility: CLINIC | Age: 74
Setting detail: OPHTHALMOLOGY
End: 2019-09-27
Payer: COMMERCIAL

## 2019-09-27 DIAGNOSIS — E11.3412: ICD-10-CM

## 2019-09-27 PROCEDURE — 67028 INJECTION EYE DRUG: CPT | Performed by: OPHTHALMOLOGY

## 2019-09-27 ASSESSMENT — VISUAL ACUITY
OS_BCVA: 20/30-1
OD_BCVA: 20/30-1

## 2019-09-27 ASSESSMENT — REFRACTION_CURRENTRX
OD_OVR_VA: 20/
OS_OVR_VA: 20/
OS_OVR_VA: 20/
OD_OVR_VA: 20/
OD_OVR_VA: 20/
OS_OVR_VA: 20/

## 2019-09-27 ASSESSMENT — REFRACTION_AUTOREFRACTION
OD_CYLINDER: -2.25
OS_SPHERE: -3.00
OS_CYLINDER: -2.00
OD_AXIS: 104
OD_SPHERE: -1.25
OS_AXIS: 094

## 2019-09-27 ASSESSMENT — REFRACTION_MANIFEST
OD_VA3: 20/
OD_VA2: 20/
OS_VA3: 20/
OS_VA1: 20/
OD_VA3: 20/
OS_VA1: 20/
OD_VA1: 20/
OD_VA1: 20/
OS_VA2: 20/
OU_VA: 20/
OU_VA: 20/
OD_VA2: 20/
OS_VA2: 20/
OS_VA3: 20/

## 2019-09-27 ASSESSMENT — SPHEQUIV_DERIVED
OS_SPHEQUIV: -4
OD_SPHEQUIV: -2.375

## 2019-10-09 ENCOUNTER — DOCTOR'S OFFICE (OUTPATIENT)
Dept: URBAN - METROPOLITAN AREA CLINIC 125 | Facility: CLINIC | Age: 74
Setting detail: OPHTHALMOLOGY
End: 2019-10-09
Payer: COMMERCIAL

## 2019-10-09 DIAGNOSIS — H43.813: ICD-10-CM

## 2019-10-09 DIAGNOSIS — H43.811: ICD-10-CM

## 2019-10-09 DIAGNOSIS — E11.3413: ICD-10-CM

## 2019-10-09 DIAGNOSIS — H04.123: ICD-10-CM

## 2019-10-09 DIAGNOSIS — H43.812: ICD-10-CM

## 2019-10-09 PROCEDURE — 92014 COMPRE OPH EXAM EST PT 1/>: CPT | Performed by: OPHTHALMOLOGY

## 2019-10-09 PROCEDURE — 92134 CPTRZ OPH DX IMG PST SGM RTA: CPT | Performed by: OPHTHALMOLOGY

## 2019-10-09 PROCEDURE — 92226 OPHTHALMOSCOPY EXT SUBSEQUENT: CPT | Performed by: OPHTHALMOLOGY

## 2019-10-09 ASSESSMENT — CONFRONTATIONAL VISUAL FIELD TEST (CVF)
OS_FINDINGS: FULL
OD_FINDINGS: FULL

## 2019-10-09 ASSESSMENT — REFRACTION_CURRENTRX
OD_OVR_VA: 20/
OD_OVR_VA: 20/
OS_OVR_VA: 20/
OD_OVR_VA: 20/
OS_OVR_VA: 20/
OS_OVR_VA: 20/

## 2019-10-09 ASSESSMENT — REFRACTION_MANIFEST
OD_VA3: 20/
OS_VA1: 20/
OS_VA1: 20/
OD_VA1: 20/
OU_VA: 20/
OU_VA: 20/
OS_VA2: 20/
OD_VA2: 20/
OD_VA3: 20/
OD_VA1: 20/
OS_VA3: 20/
OD_VA2: 20/
OS_VA2: 20/
OS_VA3: 20/

## 2019-10-09 ASSESSMENT — REFRACTION_AUTOREFRACTION
OS_SPHERE: -3.00
OD_AXIS: 104
OD_SPHERE: -1.25
OS_CYLINDER: -2.00
OD_CYLINDER: -2.25
OS_AXIS: 094

## 2019-10-09 ASSESSMENT — SPHEQUIV_DERIVED
OS_SPHEQUIV: -4
OD_SPHEQUIV: -2.375

## 2019-10-09 ASSESSMENT — LID EXAM ASSESSMENTS
OS_COMMENTS: WOUND HEALED
OD_EDEMA: ABSENT

## 2019-10-09 ASSESSMENT — SUPERFICIAL PUNCTATE KERATITIS (SPK)
OS_SPK: 1+
OD_SPK: 1+

## 2019-10-09 ASSESSMENT — VISUAL ACUITY
OD_BCVA: 20/40-2
OS_BCVA: 20/25

## 2019-10-17 ENCOUNTER — DOCTOR'S OFFICE (OUTPATIENT)
Dept: URBAN - METROPOLITAN AREA CLINIC 125 | Facility: CLINIC | Age: 74
Setting detail: OPHTHALMOLOGY
End: 2019-10-17
Payer: COMMERCIAL

## 2019-10-17 DIAGNOSIS — H43.812: ICD-10-CM

## 2019-10-17 DIAGNOSIS — H18.51: ICD-10-CM

## 2019-10-17 DIAGNOSIS — E11.3413: ICD-10-CM

## 2019-10-17 DIAGNOSIS — H43.811: ICD-10-CM

## 2019-10-17 DIAGNOSIS — H04.123: ICD-10-CM

## 2019-10-17 PROCEDURE — 92014 COMPRE OPH EXAM EST PT 1/>: CPT | Performed by: OPHTHALMOLOGY

## 2019-10-17 ASSESSMENT — REFRACTION_MANIFEST
OD_VA1: 20/
OS_VA3: 20/
OD_VA3: 20/
OS_VA3: 20/
OU_VA: 20/
OD_VA1: 20/
OS_VA2: 20/
OU_VA: 20/
OD_VA2: 20/
OD_VA3: 20/
OD_VA2: 20/
OS_VA1: 20/
OS_VA1: 20/
OS_VA2: 20/

## 2019-10-17 ASSESSMENT — SUPERFICIAL PUNCTATE KERATITIS (SPK)
OS_SPK: 1+
OD_SPK: 1+

## 2019-10-17 ASSESSMENT — REFRACTION_CURRENTRX
OD_OVR_VA: 20/
OD_OVR_VA: 20/
OS_OVR_VA: 20/
OS_OVR_VA: 20/
OD_OVR_VA: 20/
OS_OVR_VA: 20/

## 2019-10-17 ASSESSMENT — SPHEQUIV_DERIVED
OD_SPHEQUIV: -2.375
OS_SPHEQUIV: -4

## 2019-10-17 ASSESSMENT — REFRACTION_AUTOREFRACTION
OD_AXIS: 104
OS_CYLINDER: -2.00
OS_SPHERE: -3.00
OD_CYLINDER: -2.25
OS_AXIS: 094
OD_SPHERE: -1.25

## 2019-10-17 ASSESSMENT — CONFRONTATIONAL VISUAL FIELD TEST (CVF)
OD_FINDINGS: FULL
OS_FINDINGS: FULL

## 2019-10-17 ASSESSMENT — LID EXAM ASSESSMENTS
OS_COMMENTS: WOUND HEALED
OD_EDEMA: ABSENT

## 2019-10-17 ASSESSMENT — VISUAL ACUITY
OD_BCVA: 20/50+2
OS_BCVA: 20/25

## 2019-10-25 ENCOUNTER — DOCTOR'S OFFICE (OUTPATIENT)
Dept: URBAN - METROPOLITAN AREA CLINIC 125 | Facility: CLINIC | Age: 74
Setting detail: OPHTHALMOLOGY
End: 2019-10-25
Payer: COMMERCIAL

## 2019-10-25 DIAGNOSIS — E11.3411: ICD-10-CM

## 2019-10-25 DIAGNOSIS — E11.3413: ICD-10-CM

## 2019-10-25 PROCEDURE — 92250 FUNDUS PHOTOGRAPHY W/I&R: CPT | Performed by: OPHTHALMOLOGY

## 2019-10-25 PROCEDURE — 92235 FLUORESCEIN ANGRPH MLTIFRAME: CPT | Performed by: OPHTHALMOLOGY

## 2019-10-25 PROCEDURE — 67028 INJECTION EYE DRUG: CPT | Performed by: OPHTHALMOLOGY

## 2019-10-25 ASSESSMENT — REFRACTION_CURRENTRX
OD_OVR_VA: 20/
OD_OVR_VA: 20/
OS_OVR_VA: 20/
OD_OVR_VA: 20/
OS_OVR_VA: 20/
OS_OVR_VA: 20/

## 2019-10-25 ASSESSMENT — REFRACTION_MANIFEST
OU_VA: 20/
OS_VA3: 20/
OD_VA3: 20/
OD_VA3: 20/
OS_VA1: 20/
OD_VA2: 20/
OD_VA1: 20/
OS_VA2: 20/
OU_VA: 20/
OD_VA1: 20/
OS_VA2: 20/
OS_VA1: 20/
OD_VA2: 20/
OS_VA3: 20/

## 2019-10-25 ASSESSMENT — SPHEQUIV_DERIVED
OS_SPHEQUIV: -4
OD_SPHEQUIV: -2.375

## 2019-10-25 ASSESSMENT — REFRACTION_AUTOREFRACTION
OS_AXIS: 094
OD_CYLINDER: -2.25
OS_SPHERE: -3.00
OS_CYLINDER: -2.00
OD_SPHERE: -1.25
OD_AXIS: 104

## 2019-10-25 ASSESSMENT — VISUAL ACUITY
OS_BCVA: 20/25-1
OD_BCVA: 20/50+2

## 2019-11-22 ENCOUNTER — DOCTOR'S OFFICE (OUTPATIENT)
Dept: URBAN - METROPOLITAN AREA CLINIC 125 | Facility: CLINIC | Age: 74
Setting detail: OPHTHALMOLOGY
End: 2019-11-22
Payer: COMMERCIAL

## 2019-11-22 DIAGNOSIS — E11.3412: ICD-10-CM

## 2019-11-22 PROCEDURE — 67028 INJECTION EYE DRUG: CPT | Performed by: OPHTHALMOLOGY

## 2019-11-22 ASSESSMENT — REFRACTION_CURRENTRX
OS_OVR_VA: 20/
OS_OVR_VA: 20/
OD_OVR_VA: 20/
OS_OVR_VA: 20/

## 2019-11-22 ASSESSMENT — REFRACTION_MANIFEST
OD_VA1: 20/
OS_VA1: 20/
OD_VA3: 20/
OS_VA3: 20/
OU_VA: 20/
OD_VA2: 20/
OD_VA2: 20/
OS_VA3: 20/
OS_VA2: 20/
OU_VA: 20/
OD_VA1: 20/
OD_VA3: 20/
OS_VA1: 20/
OS_VA2: 20/

## 2019-11-22 ASSESSMENT — SPHEQUIV_DERIVED
OD_SPHEQUIV: -2.375
OS_SPHEQUIV: -4

## 2019-11-22 ASSESSMENT — REFRACTION_AUTOREFRACTION
OS_CYLINDER: -2.00
OD_AXIS: 104
OD_CYLINDER: -2.25
OS_AXIS: 094
OD_SPHERE: -1.25
OS_SPHERE: -3.00

## 2019-11-22 ASSESSMENT — VISUAL ACUITY
OS_BCVA: 20/25-1
OD_BCVA: 20/50+2

## 2019-12-11 ENCOUNTER — DOCTOR'S OFFICE (OUTPATIENT)
Dept: URBAN - METROPOLITAN AREA CLINIC 125 | Facility: CLINIC | Age: 74
Setting detail: OPHTHALMOLOGY
End: 2019-12-11
Payer: COMMERCIAL

## 2019-12-11 DIAGNOSIS — H04.122: ICD-10-CM

## 2019-12-11 DIAGNOSIS — H43.812: ICD-10-CM

## 2019-12-11 DIAGNOSIS — E11.3413: ICD-10-CM

## 2019-12-11 DIAGNOSIS — H04.121: ICD-10-CM

## 2019-12-11 DIAGNOSIS — H43.813: ICD-10-CM

## 2019-12-11 DIAGNOSIS — H43.811: ICD-10-CM

## 2019-12-11 DIAGNOSIS — H04.123: ICD-10-CM

## 2019-12-11 PROCEDURE — 92014 COMPRE OPH EXAM EST PT 1/>: CPT | Performed by: OPHTHALMOLOGY

## 2019-12-11 PROCEDURE — 83861 MICROFLUID ANALY TEARS: CPT | Performed by: OPHTHALMOLOGY

## 2019-12-11 PROCEDURE — 92134 CPTRZ OPH DX IMG PST SGM RTA: CPT | Performed by: OPHTHALMOLOGY

## 2019-12-11 PROCEDURE — 92226 OPHTHALMOSCOPY EXT SUBSEQUENT: CPT | Performed by: OPHTHALMOLOGY

## 2019-12-11 ASSESSMENT — REFRACTION_AUTOREFRACTION
OD_AXIS: 104
OS_AXIS: 094
OD_SPHERE: -1.25
OS_SPHERE: -3.00
OD_CYLINDER: -2.25
OS_CYLINDER: -2.00

## 2019-12-11 ASSESSMENT — REFRACTION_MANIFEST
OS_VA3: 20/
OS_VA1: 20/
OS_VA2: 20/
OS_VA3: 20/
OD_VA1: 20/
OU_VA: 20/
OU_VA: 20/
OD_VA1: 20/
OD_VA2: 20/
OS_VA1: 20/
OD_VA2: 20/
OD_VA3: 20/
OS_VA2: 20/
OD_VA3: 20/

## 2019-12-11 ASSESSMENT — REFRACTION_CURRENTRX
OS_OVR_VA: 20/
OD_OVR_VA: 20/
OS_OVR_VA: 20/
OD_OVR_VA: 20/
OS_OVR_VA: 20/
OD_OVR_VA: 20/

## 2019-12-11 ASSESSMENT — VISUAL ACUITY
OS_BCVA: 20/30-2
OD_BCVA: 20/40

## 2019-12-11 ASSESSMENT — SPHEQUIV_DERIVED
OS_SPHEQUIV: -4
OD_SPHEQUIV: -2.375

## 2019-12-11 ASSESSMENT — SUPERFICIAL PUNCTATE KERATITIS (SPK)
OS_SPK: 1+
OD_SPK: 1+

## 2019-12-11 ASSESSMENT — CONFRONTATIONAL VISUAL FIELD TEST (CVF)
OD_FINDINGS: FULL
OS_FINDINGS: FULL

## 2019-12-11 ASSESSMENT — LID EXAM ASSESSMENTS
OS_COMMENTS: WOUND HEALED
OD_EDEMA: ABSENT

## 2019-12-18 ENCOUNTER — DOCTOR'S OFFICE (OUTPATIENT)
Dept: URBAN - METROPOLITAN AREA CLINIC 125 | Facility: CLINIC | Age: 74
Setting detail: OPHTHALMOLOGY
End: 2019-12-18
Payer: COMMERCIAL

## 2019-12-18 DIAGNOSIS — E11.3411: ICD-10-CM

## 2019-12-18 PROCEDURE — 67028 INJECTION EYE DRUG: CPT | Performed by: OPHTHALMOLOGY

## 2019-12-18 ASSESSMENT — REFRACTION_MANIFEST
OS_VA2: 20/
OD_VA2: 20/
OD_VA1: 20/
OD_VA1: 20/
OS_VA3: 20/
OS_VA2: 20/
OD_VA3: 20/
OD_VA2: 20/
OU_VA: 20/
OS_VA1: 20/
OS_VA1: 20/
OD_VA3: 20/
OS_VA3: 20/
OU_VA: 20/

## 2019-12-18 ASSESSMENT — REFRACTION_AUTOREFRACTION
OD_SPHERE: -1.25
OD_CYLINDER: -2.25
OS_CYLINDER: -2.00
OD_AXIS: 104
OS_AXIS: 094
OS_SPHERE: -3.00

## 2019-12-18 ASSESSMENT — REFRACTION_CURRENTRX
OD_OVR_VA: 20/
OD_OVR_VA: 20/
OS_OVR_VA: 20/
OD_OVR_VA: 20/
OS_OVR_VA: 20/
OS_OVR_VA: 20/

## 2019-12-18 ASSESSMENT — VISUAL ACUITY
OD_BCVA: 20/40
OS_BCVA: 20/30-2

## 2019-12-18 ASSESSMENT — SPHEQUIV_DERIVED
OD_SPHEQUIV: -2.375
OS_SPHEQUIV: -4

## 2020-01-08 ENCOUNTER — DOCTOR'S OFFICE (OUTPATIENT)
Dept: URBAN - METROPOLITAN AREA CLINIC 125 | Facility: CLINIC | Age: 75
Setting detail: OPHTHALMOLOGY
End: 2020-01-08
Payer: COMMERCIAL

## 2020-01-08 DIAGNOSIS — H43.813: ICD-10-CM

## 2020-01-08 DIAGNOSIS — H49.21: ICD-10-CM

## 2020-01-08 DIAGNOSIS — E11.3413: ICD-10-CM

## 2020-01-08 DIAGNOSIS — H04.123: ICD-10-CM

## 2020-01-08 PROCEDURE — 92012 INTRM OPH EXAM EST PATIENT: CPT | Performed by: OPHTHALMOLOGY

## 2020-01-08 ASSESSMENT — REFRACTION_AUTOREFRACTION
OS_SPHERE: -3.00
OD_SPHERE: -1.25
OS_AXIS: 094
OD_CYLINDER: -2.25
OD_AXIS: 104
OS_CYLINDER: -2.00

## 2020-01-08 ASSESSMENT — SPHEQUIV_DERIVED
OS_SPHEQUIV: -4
OD_SPHEQUIV: -2.375

## 2020-01-08 ASSESSMENT — SUPERFICIAL PUNCTATE KERATITIS (SPK)
OS_SPK: 1+
OD_SPK: 1+

## 2020-01-08 ASSESSMENT — KERATOMETRY
OD_K2POWER_DIOPTERS: 45.00
OD_K1POWER_DIOPTERS: 43.00
OS_AXISANGLE_DEGREES: 103
OS_K1POWER_DIOPTERS: 43.75
OS_K2POWER_DIOPTERS: 45.00
OD_AXISANGLE_DEGREES: 106

## 2020-01-08 ASSESSMENT — LID EXAM ASSESSMENTS
OD_EDEMA: ABSENT
OS_COMMENTS: WOUND HEALED

## 2020-01-08 ASSESSMENT — VISUAL ACUITY
OD_BCVA: 20/40
OS_BCVA: 20/40

## 2020-01-08 ASSESSMENT — CONFRONTATIONAL VISUAL FIELD TEST (CVF)
OD_FINDINGS: FULL
OS_FINDINGS: FULL

## 2020-01-08 ASSESSMENT — AXIALLENGTH_DERIVED
OS_AL: 24.8974
OD_AL: 24.3576

## 2020-01-10 ENCOUNTER — DOCTOR'S OFFICE (OUTPATIENT)
Dept: URBAN - METROPOLITAN AREA CLINIC 125 | Facility: CLINIC | Age: 75
Setting detail: OPHTHALMOLOGY
End: 2020-01-10
Payer: COMMERCIAL

## 2020-01-10 DIAGNOSIS — E11.3413: ICD-10-CM

## 2020-01-10 DIAGNOSIS — E11.3411: ICD-10-CM

## 2020-01-10 PROCEDURE — 92250 FUNDUS PHOTOGRAPHY W/I&R: CPT | Performed by: OPHTHALMOLOGY

## 2020-01-10 PROCEDURE — 67028 INJECTION EYE DRUG: CPT | Performed by: OPHTHALMOLOGY

## 2020-01-10 PROCEDURE — 92235 FLUORESCEIN ANGRPH MLTIFRAME: CPT | Performed by: OPHTHALMOLOGY

## 2020-01-10 ASSESSMENT — KERATOMETRY
OD_AXISANGLE_DEGREES: 106
OD_K2POWER_DIOPTERS: 45.00
OS_K1POWER_DIOPTERS: 43.75
OD_K1POWER_DIOPTERS: 43.00
OS_AXISANGLE_DEGREES: 103
OS_K2POWER_DIOPTERS: 45.00

## 2020-01-10 ASSESSMENT — VISUAL ACUITY
OS_BCVA: 20/40
OD_BCVA: 20/40

## 2020-01-10 ASSESSMENT — REFRACTION_AUTOREFRACTION
OD_SPHERE: -1.25
OS_CYLINDER: -2.00
OD_AXIS: 104
OS_AXIS: 094
OS_SPHERE: -3.00
OD_CYLINDER: -2.25

## 2020-01-10 ASSESSMENT — SPHEQUIV_DERIVED
OD_SPHEQUIV: -2.375
OS_SPHEQUIV: -4

## 2020-01-10 ASSESSMENT — AXIALLENGTH_DERIVED
OD_AL: 24.3576
OS_AL: 24.8974

## 2020-01-15 ENCOUNTER — DOCTOR'S OFFICE (OUTPATIENT)
Dept: URBAN - METROPOLITAN AREA CLINIC 125 | Facility: CLINIC | Age: 75
Setting detail: OPHTHALMOLOGY
End: 2020-01-15
Payer: COMMERCIAL

## 2020-01-15 DIAGNOSIS — E11.3412: ICD-10-CM

## 2020-01-15 PROCEDURE — 67028 INJECTION EYE DRUG: CPT | Performed by: OPHTHALMOLOGY

## 2020-01-15 ASSESSMENT — REFRACTION_AUTOREFRACTION
OS_SPHERE: -3.00
OS_AXIS: 094
OD_CYLINDER: -2.25
OS_CYLINDER: -2.00
OD_SPHERE: -1.25
OD_AXIS: 104

## 2020-01-15 ASSESSMENT — KERATOMETRY
OD_K2POWER_DIOPTERS: 45.00
OD_AXISANGLE_DEGREES: 106
OS_K1POWER_DIOPTERS: 43.75
OS_K2POWER_DIOPTERS: 45.00
OS_AXISANGLE_DEGREES: 103
OD_K1POWER_DIOPTERS: 43.00

## 2020-01-15 ASSESSMENT — VISUAL ACUITY
OD_BCVA: 20/40
OS_BCVA: 20/40

## 2020-01-15 ASSESSMENT — AXIALLENGTH_DERIVED
OS_AL: 24.8974
OD_AL: 24.3576

## 2020-01-15 ASSESSMENT — SPHEQUIV_DERIVED
OS_SPHEQUIV: -4
OD_SPHEQUIV: -2.375

## 2020-01-29 ENCOUNTER — DOCTOR'S OFFICE (OUTPATIENT)
Dept: URBAN - METROPOLITAN AREA CLINIC 125 | Facility: CLINIC | Age: 75
Setting detail: OPHTHALMOLOGY
End: 2020-01-29
Payer: COMMERCIAL

## 2020-01-29 DIAGNOSIS — H04.122: ICD-10-CM

## 2020-01-29 DIAGNOSIS — E11.3413: ICD-10-CM

## 2020-01-29 DIAGNOSIS — H04.121: ICD-10-CM

## 2020-01-29 DIAGNOSIS — H43.813: ICD-10-CM

## 2020-01-29 PROCEDURE — 83861 MICROFLUID ANALY TEARS: CPT | Performed by: OPHTHALMOLOGY

## 2020-01-29 PROCEDURE — 92014 COMPRE OPH EXAM EST PT 1/>: CPT | Performed by: OPHTHALMOLOGY

## 2020-01-29 PROCEDURE — 92134 CPTRZ OPH DX IMG PST SGM RTA: CPT | Performed by: OPHTHALMOLOGY

## 2020-01-29 PROCEDURE — 92201 OPSCPY EXTND RTA DRAW UNI/BI: CPT | Performed by: OPHTHALMOLOGY

## 2020-01-29 ASSESSMENT — AXIALLENGTH_DERIVED
OS_AL: 24.8974
OD_AL: 24.3576

## 2020-01-29 ASSESSMENT — REFRACTION_AUTOREFRACTION
OD_AXIS: 104
OD_SPHERE: -1.25
OS_SPHERE: -3.00
OD_CYLINDER: -2.25
OS_AXIS: 094
OS_CYLINDER: -2.00

## 2020-01-29 ASSESSMENT — VISUAL ACUITY
OS_BCVA: 20/40-1
OD_BCVA: 20/40

## 2020-01-29 ASSESSMENT — KERATOMETRY
OS_K2POWER_DIOPTERS: 45.00
OD_K1POWER_DIOPTERS: 43.00
OD_K2POWER_DIOPTERS: 45.00
OS_K1POWER_DIOPTERS: 43.75
OS_AXISANGLE_DEGREES: 103
OD_AXISANGLE_DEGREES: 106

## 2020-01-29 ASSESSMENT — SUPERFICIAL PUNCTATE KERATITIS (SPK)
OD_SPK: 1+
OS_SPK: 1+

## 2020-01-29 ASSESSMENT — LID EXAM ASSESSMENTS
OD_EDEMA: ABSENT
OS_COMMENTS: WOUND HEALED

## 2020-01-29 ASSESSMENT — CONFRONTATIONAL VISUAL FIELD TEST (CVF)
OS_FINDINGS: FULL
OD_FINDINGS: FULL

## 2020-01-29 ASSESSMENT — SPHEQUIV_DERIVED
OS_SPHEQUIV: -4
OD_SPHEQUIV: -2.375

## 2020-02-12 ENCOUNTER — DOCTOR'S OFFICE (OUTPATIENT)
Dept: URBAN - METROPOLITAN AREA CLINIC 125 | Facility: CLINIC | Age: 75
Setting detail: OPHTHALMOLOGY
End: 2020-02-12
Payer: COMMERCIAL

## 2020-02-12 DIAGNOSIS — E11.3411: ICD-10-CM

## 2020-02-12 PROCEDURE — 67028 INJECTION EYE DRUG: CPT | Performed by: OPHTHALMOLOGY

## 2020-02-12 ASSESSMENT — VISUAL ACUITY
OD_BCVA: 20/40
OS_BCVA: 20/40-1

## 2020-02-12 ASSESSMENT — KERATOMETRY
OS_AXISANGLE_DEGREES: 103
OD_K1POWER_DIOPTERS: 43.00
OS_K2POWER_DIOPTERS: 45.00
OD_K2POWER_DIOPTERS: 45.00
OS_K1POWER_DIOPTERS: 43.75
OD_AXISANGLE_DEGREES: 106

## 2020-02-12 ASSESSMENT — REFRACTION_AUTOREFRACTION
OD_AXIS: 104
OS_CYLINDER: -2.00
OS_SPHERE: -3.00
OD_CYLINDER: -2.25
OS_AXIS: 094
OD_SPHERE: -1.25

## 2020-02-12 ASSESSMENT — AXIALLENGTH_DERIVED
OS_AL: 24.8974
OD_AL: 24.3576

## 2020-02-12 ASSESSMENT — SPHEQUIV_DERIVED
OS_SPHEQUIV: -4
OD_SPHEQUIV: -2.375

## 2020-02-14 ENCOUNTER — DOCTOR'S OFFICE (OUTPATIENT)
Dept: URBAN - METROPOLITAN AREA CLINIC 125 | Facility: CLINIC | Age: 75
Setting detail: OPHTHALMOLOGY
End: 2020-02-14
Payer: COMMERCIAL

## 2020-02-14 DIAGNOSIS — E11.3412: ICD-10-CM

## 2020-02-14 PROCEDURE — 67028 INJECTION EYE DRUG: CPT | Performed by: OPHTHALMOLOGY

## 2020-02-14 ASSESSMENT — KERATOMETRY
OD_K2POWER_DIOPTERS: 45.00
OD_AXISANGLE_DEGREES: 106
OS_K1POWER_DIOPTERS: 43.75
OS_AXISANGLE_DEGREES: 103
OS_K2POWER_DIOPTERS: 45.00
OD_K1POWER_DIOPTERS: 43.00

## 2020-02-14 ASSESSMENT — VISUAL ACUITY
OD_BCVA: 20/40
OS_BCVA: 20/40-1

## 2020-02-14 ASSESSMENT — AXIALLENGTH_DERIVED
OS_AL: 24.8974
OD_AL: 24.3576

## 2020-02-14 ASSESSMENT — REFRACTION_AUTOREFRACTION
OS_CYLINDER: -2.00
OS_AXIS: 094
OD_SPHERE: -1.25
OD_AXIS: 104
OD_CYLINDER: -2.25
OS_SPHERE: -3.00

## 2020-02-14 ASSESSMENT — SPHEQUIV_DERIVED
OS_SPHEQUIV: -4
OD_SPHEQUIV: -2.375

## 2020-02-25 ENCOUNTER — DOCTOR'S OFFICE (OUTPATIENT)
Dept: URBAN - METROPOLITAN AREA CLINIC 125 | Facility: CLINIC | Age: 75
Setting detail: OPHTHALMOLOGY
End: 2020-02-25
Payer: COMMERCIAL

## 2020-02-25 DIAGNOSIS — H04.123: ICD-10-CM

## 2020-02-25 DIAGNOSIS — H27.8: ICD-10-CM

## 2020-02-25 DIAGNOSIS — H49.21: ICD-10-CM

## 2020-02-25 DIAGNOSIS — H27.9: ICD-10-CM

## 2020-02-25 DIAGNOSIS — H02.403: ICD-10-CM

## 2020-02-25 PROCEDURE — 92014 COMPRE OPH EXAM EST PT 1/>: CPT | Performed by: OPHTHALMOLOGY

## 2020-02-25 PROCEDURE — 92060 SENSORIMOTOR EXAMINATION: CPT | Performed by: OPHTHALMOLOGY

## 2020-02-25 ASSESSMENT — CONFRONTATIONAL VISUAL FIELD TEST (CVF)
OD_FINDINGS: FULL
OS_FINDINGS: FULL

## 2020-02-25 ASSESSMENT — SUPERFICIAL PUNCTATE KERATITIS (SPK)
OS_SPK: 1+
OD_SPK: 1+

## 2020-02-25 ASSESSMENT — KERATOMETRY
OS_K1POWER_DIOPTERS: 43.75
OS_K2POWER_DIOPTERS: 45.00
OD_AXISANGLE_DEGREES: 106
OS_AXISANGLE_DEGREES: 103
OD_K2POWER_DIOPTERS: 45.00
OD_K1POWER_DIOPTERS: 43.00

## 2020-02-25 ASSESSMENT — REFRACTION_CURRENTRX
OD_OVR_VA: 20/
OD_ADD: +2.00
OS_SPHERE: -1.00
OS_OVR_VA: 20/
OS_VPRISM_DIRECTION: PROGS
OD_SPHERE: PLANO
OD_CYLINDER: -2.25
OD_VPRISM_DIRECTION: PROGS
OS_ADD: +1.50
OD_AXIS: 103
OS_CYLINDER: -2.50
OS_AXIS: 099

## 2020-02-25 ASSESSMENT — VISUAL ACUITY
OD_BCVA: 20/40-1
OS_BCVA: 20/40+1

## 2020-02-25 ASSESSMENT — AXIALLENGTH_DERIVED
OS_AL: 24.8974
OD_AL: 24.3576

## 2020-02-25 ASSESSMENT — REFRACTION_AUTOREFRACTION
OD_SPHERE: -1.25
OS_CYLINDER: -2.00
OS_AXIS: 094
OD_CYLINDER: -2.25
OD_AXIS: 104
OS_SPHERE: -3.00

## 2020-02-25 ASSESSMENT — SPHEQUIV_DERIVED
OD_SPHEQUIV: -2.375
OS_SPHEQUIV: -4

## 2020-05-08 ENCOUNTER — DOCTOR'S OFFICE (OUTPATIENT)
Dept: URBAN - METROPOLITAN AREA CLINIC 125 | Facility: CLINIC | Age: 75
Setting detail: OPHTHALMOLOGY
End: 2020-05-08
Payer: COMMERCIAL

## 2020-05-08 VITALS — HEIGHT: 55 IN

## 2020-05-08 DIAGNOSIS — E11.3413: ICD-10-CM

## 2020-05-08 DIAGNOSIS — H04.123: ICD-10-CM

## 2020-05-08 DIAGNOSIS — H43.813: ICD-10-CM

## 2020-05-08 PROCEDURE — 92201 OPSCPY EXTND RTA DRAW UNI/BI: CPT | Performed by: OPHTHALMOLOGY

## 2020-05-08 PROCEDURE — 92134 CPTRZ OPH DX IMG PST SGM RTA: CPT | Performed by: OPHTHALMOLOGY

## 2020-05-08 PROCEDURE — 92014 COMPRE OPH EXAM EST PT 1/>: CPT | Performed by: OPHTHALMOLOGY

## 2020-05-08 ASSESSMENT — REFRACTION_AUTOREFRACTION
OS_CYLINDER: -2.00
OS_SPHERE: -3.00
OS_AXIS: 094
OD_SPHERE: -1.25
OD_AXIS: 104
OD_CYLINDER: -2.25

## 2020-05-08 ASSESSMENT — KERATOMETRY
OD_AXISANGLE_DEGREES: 106
OS_AXISANGLE_DEGREES: 103
OS_K2POWER_DIOPTERS: 45.00
OD_K2POWER_DIOPTERS: 45.00
OS_K1POWER_DIOPTERS: 43.75
OD_K1POWER_DIOPTERS: 43.00

## 2020-05-08 ASSESSMENT — SUPERFICIAL PUNCTATE KERATITIS (SPK)
OD_SPK: 1+
OS_SPK: 1+

## 2020-05-08 ASSESSMENT — REFRACTION_CURRENTRX
OS_SPHERE: -1.00
OS_AXIS: 099
OD_ADD: +2.00
OD_SPHERE: PLANO
OS_ADD: +1.50
OD_AXIS: 103
OS_CYLINDER: -2.50
OD_OVR_VA: 20/
OD_VPRISM_DIRECTION: PROGS
OD_CYLINDER: -2.25
OS_OVR_VA: 20/
OS_VPRISM_DIRECTION: PROGS

## 2020-05-08 ASSESSMENT — SPHEQUIV_DERIVED
OD_SPHEQUIV: -2.375
OS_SPHEQUIV: -4

## 2020-05-08 ASSESSMENT — AXIALLENGTH_DERIVED
OS_AL: 24.8974
OD_AL: 24.3576

## 2020-05-08 ASSESSMENT — VISUAL ACUITY
OS_BCVA: 20/25-2
OD_BCVA: 20/25

## 2020-05-08 ASSESSMENT — CONFRONTATIONAL VISUAL FIELD TEST (CVF)
OD_FINDINGS: FULL
OS_FINDINGS: FULL

## 2020-05-13 ENCOUNTER — DOCTOR'S OFFICE (OUTPATIENT)
Dept: URBAN - METROPOLITAN AREA CLINIC 125 | Facility: CLINIC | Age: 75
Setting detail: OPHTHALMOLOGY
End: 2020-05-13
Payer: COMMERCIAL

## 2020-05-13 DIAGNOSIS — H43.813: ICD-10-CM

## 2020-05-13 DIAGNOSIS — E10.3591: ICD-10-CM

## 2020-05-13 DIAGNOSIS — E11.3411: ICD-10-CM

## 2020-05-13 DIAGNOSIS — H43.811: ICD-10-CM

## 2020-05-13 DIAGNOSIS — E10.3592: ICD-10-CM

## 2020-05-13 DIAGNOSIS — E11.3413: ICD-10-CM

## 2020-05-13 DIAGNOSIS — H43.812: ICD-10-CM

## 2020-05-13 DIAGNOSIS — H04.123: ICD-10-CM

## 2020-05-13 PROCEDURE — 67028 INJECTION EYE DRUG: CPT | Performed by: OPHTHALMOLOGY

## 2020-05-13 PROCEDURE — 92014 COMPRE OPH EXAM EST PT 1/>: CPT | Performed by: OPHTHALMOLOGY

## 2020-05-13 PROCEDURE — 92235 FLUORESCEIN ANGRPH MLTIFRAME: CPT | Performed by: OPHTHALMOLOGY

## 2020-05-13 PROCEDURE — 92250 FUNDUS PHOTOGRAPHY W/I&R: CPT | Performed by: OPHTHALMOLOGY

## 2020-05-13 ASSESSMENT — REFRACTION_CURRENTRX
OD_SPHERE: PLANO
OD_OVR_VA: 20/
OD_ADD: +2.00
OD_VPRISM_DIRECTION: PROGS
OD_CYLINDER: -2.25
OD_AXIS: 103
OS_AXIS: 099
OS_CYLINDER: -2.50
OS_ADD: +1.50
OS_VPRISM_DIRECTION: PROGS
OS_OVR_VA: 20/
OS_SPHERE: -1.00

## 2020-05-13 ASSESSMENT — KERATOMETRY
OD_K1POWER_DIOPTERS: 43.00
OS_K2POWER_DIOPTERS: 45.00
OS_K1POWER_DIOPTERS: 43.75
OS_AXISANGLE_DEGREES: 103
OD_K2POWER_DIOPTERS: 45.00
OD_AXISANGLE_DEGREES: 106

## 2020-05-13 ASSESSMENT — SPHEQUIV_DERIVED
OS_SPHEQUIV: -4
OD_SPHEQUIV: -2.375

## 2020-05-13 ASSESSMENT — AXIALLENGTH_DERIVED
OS_AL: 24.8974
OD_AL: 24.3576

## 2020-05-13 ASSESSMENT — REFRACTION_AUTOREFRACTION
OD_AXIS: 104
OS_SPHERE: -3.00
OD_CYLINDER: -2.25
OS_CYLINDER: -2.00
OS_AXIS: 094
OD_SPHERE: -1.25

## 2020-05-13 ASSESSMENT — SUPERFICIAL PUNCTATE KERATITIS (SPK)
OD_SPK: 1+
OS_SPK: 1+

## 2020-05-13 ASSESSMENT — CONFRONTATIONAL VISUAL FIELD TEST (CVF)
OD_FINDINGS: FULL
OS_FINDINGS: FULL

## 2020-05-13 ASSESSMENT — VISUAL ACUITY
OD_BCVA: 20/25
OS_BCVA: 20/25-2

## 2020-05-20 ENCOUNTER — DOCTOR'S OFFICE (OUTPATIENT)
Dept: URBAN - METROPOLITAN AREA CLINIC 125 | Facility: CLINIC | Age: 75
Setting detail: OPHTHALMOLOGY
End: 2020-05-20
Payer: COMMERCIAL

## 2020-05-20 DIAGNOSIS — E11.3412: ICD-10-CM

## 2020-05-20 PROCEDURE — 67028 INJECTION EYE DRUG: CPT | Performed by: OPHTHALMOLOGY

## 2020-05-20 ASSESSMENT — REFRACTION_CURRENTRX
OS_ADD: +1.50
OD_OVR_VA: 20/
OS_OVR_VA: 20/
OS_CYLINDER: -2.50
OD_VPRISM_DIRECTION: PROGS
OD_SPHERE: PLANO
OD_ADD: +2.00
OS_AXIS: 099
OD_AXIS: 103
OS_VPRISM_DIRECTION: PROGS
OD_CYLINDER: -2.25
OS_SPHERE: -1.00

## 2020-05-20 ASSESSMENT — REFRACTION_AUTOREFRACTION
OS_SPHERE: -3.00
OD_AXIS: 104
OD_CYLINDER: -2.25
OS_CYLINDER: -2.00
OS_AXIS: 094
OD_SPHERE: -1.25

## 2020-05-20 ASSESSMENT — KERATOMETRY
OS_K2POWER_DIOPTERS: 45.00
OD_K1POWER_DIOPTERS: 43.00
OD_K2POWER_DIOPTERS: 45.00
OS_AXISANGLE_DEGREES: 103
OS_K1POWER_DIOPTERS: 43.75
OD_AXISANGLE_DEGREES: 106

## 2020-05-20 ASSESSMENT — AXIALLENGTH_DERIVED
OS_AL: 24.8974
OD_AL: 24.3576

## 2020-05-20 ASSESSMENT — SPHEQUIV_DERIVED
OS_SPHEQUIV: -4
OD_SPHEQUIV: -2.375

## 2020-05-20 ASSESSMENT — VISUAL ACUITY
OS_BCVA: 20/25-2
OD_BCVA: 20/25

## 2020-06-03 ENCOUNTER — DOCTOR'S OFFICE (OUTPATIENT)
Dept: URBAN - METROPOLITAN AREA CLINIC 125 | Facility: CLINIC | Age: 75
Setting detail: OPHTHALMOLOGY
End: 2020-06-03
Payer: COMMERCIAL

## 2020-06-03 DIAGNOSIS — H43.811: ICD-10-CM

## 2020-06-03 DIAGNOSIS — E11.3413: ICD-10-CM

## 2020-06-03 DIAGNOSIS — H04.123: ICD-10-CM

## 2020-06-03 DIAGNOSIS — H43.812: ICD-10-CM

## 2020-06-03 PROCEDURE — 92134 CPTRZ OPH DX IMG PST SGM RTA: CPT | Performed by: OPHTHALMOLOGY

## 2020-06-03 PROCEDURE — 92201 OPSCPY EXTND RTA DRAW UNI/BI: CPT | Performed by: OPHTHALMOLOGY

## 2020-06-03 PROCEDURE — 92014 COMPRE OPH EXAM EST PT 1/>: CPT | Performed by: OPHTHALMOLOGY

## 2020-06-03 ASSESSMENT — REFRACTION_CURRENTRX
OD_CYLINDER: -2.25
OS_VPRISM_DIRECTION: PROGS
OS_SPHERE: -1.00
OD_ADD: +2.00
OD_OVR_VA: 20/
OS_OVR_VA: 20/
OS_ADD: +1.50
OD_AXIS: 103
OD_VPRISM_DIRECTION: PROGS
OD_SPHERE: PLANO
OS_AXIS: 099
OS_CYLINDER: -2.50

## 2020-06-03 ASSESSMENT — AXIALLENGTH_DERIVED
OS_AL: 24.8974
OD_AL: 24.3576

## 2020-06-03 ASSESSMENT — CONFRONTATIONAL VISUAL FIELD TEST (CVF)
OD_FINDINGS: FULL
OS_FINDINGS: FULL

## 2020-06-03 ASSESSMENT — SPHEQUIV_DERIVED
OS_SPHEQUIV: -4
OD_SPHEQUIV: -2.375

## 2020-06-03 ASSESSMENT — VISUAL ACUITY
OS_BCVA: 20/20
OD_BCVA: 20/25-1

## 2020-06-03 ASSESSMENT — REFRACTION_AUTOREFRACTION
OS_SPHERE: -3.00
OD_SPHERE: -1.25
OS_CYLINDER: -2.00
OD_CYLINDER: -2.25
OS_AXIS: 094
OD_AXIS: 104

## 2020-06-03 ASSESSMENT — KERATOMETRY
OS_K2POWER_DIOPTERS: 45.00
OS_AXISANGLE_DEGREES: 103
OD_AXISANGLE_DEGREES: 106
OD_K2POWER_DIOPTERS: 45.00
OS_K1POWER_DIOPTERS: 43.75
OD_K1POWER_DIOPTERS: 43.00

## 2020-06-03 ASSESSMENT — SUPERFICIAL PUNCTATE KERATITIS (SPK)
OS_SPK: 1+
OD_SPK: 1+

## 2020-06-12 ENCOUNTER — DOCTOR'S OFFICE (OUTPATIENT)
Dept: URBAN - METROPOLITAN AREA CLINIC 125 | Facility: CLINIC | Age: 75
Setting detail: OPHTHALMOLOGY
End: 2020-06-12
Payer: COMMERCIAL

## 2020-06-12 VITALS — HEIGHT: 55 IN

## 2020-06-12 DIAGNOSIS — E11.3411: ICD-10-CM

## 2020-06-12 PROCEDURE — 67028 INJECTION EYE DRUG: CPT | Performed by: OPHTHALMOLOGY

## 2020-06-12 ASSESSMENT — KERATOMETRY
OS_K1POWER_DIOPTERS: 43.75
OD_AXISANGLE_DEGREES: 106
OD_K1POWER_DIOPTERS: 43.00
OS_AXISANGLE_DEGREES: 103
OS_K2POWER_DIOPTERS: 45.00
OD_K2POWER_DIOPTERS: 45.00

## 2020-06-12 ASSESSMENT — REFRACTION_CURRENTRX
OS_VPRISM_DIRECTION: PROGS
OD_ADD: +2.00
OS_ADD: +1.50
OD_OVR_VA: 20/
OS_CYLINDER: -2.50
OS_SPHERE: -1.00
OD_SPHERE: PLANO
OS_AXIS: 099
OS_OVR_VA: 20/
OD_CYLINDER: -2.25
OD_AXIS: 103
OD_VPRISM_DIRECTION: PROGS

## 2020-06-12 ASSESSMENT — SPHEQUIV_DERIVED
OD_SPHEQUIV: -2.375
OS_SPHEQUIV: -4

## 2020-06-12 ASSESSMENT — REFRACTION_AUTOREFRACTION
OD_CYLINDER: -2.25
OD_SPHERE: -1.25
OS_CYLINDER: -2.00
OS_AXIS: 094
OD_AXIS: 104
OS_SPHERE: -3.00

## 2020-06-12 ASSESSMENT — AXIALLENGTH_DERIVED
OD_AL: 24.3576
OS_AL: 24.8974

## 2020-06-12 ASSESSMENT — VISUAL ACUITY
OS_BCVA: 20/30-1
OD_BCVA: 20/25-1

## 2020-06-17 ENCOUNTER — DOCTOR'S OFFICE (OUTPATIENT)
Dept: URBAN - METROPOLITAN AREA CLINIC 125 | Facility: CLINIC | Age: 75
Setting detail: OPHTHALMOLOGY
End: 2020-06-17
Payer: COMMERCIAL

## 2020-06-17 DIAGNOSIS — E11.3412: ICD-10-CM

## 2020-06-17 PROCEDURE — 67028 INJECTION EYE DRUG: CPT | Performed by: OPHTHALMOLOGY

## 2020-06-17 ASSESSMENT — KERATOMETRY
OD_K1POWER_DIOPTERS: 43.00
OD_AXISANGLE_DEGREES: 106
OS_K2POWER_DIOPTERS: 45.00
OD_K2POWER_DIOPTERS: 45.00
OS_K1POWER_DIOPTERS: 43.75
OS_AXISANGLE_DEGREES: 103

## 2020-06-17 ASSESSMENT — SPHEQUIV_DERIVED
OD_SPHEQUIV: -2.375
OS_SPHEQUIV: -4

## 2020-06-17 ASSESSMENT — REFRACTION_AUTOREFRACTION
OS_SPHERE: -3.00
OD_SPHERE: -1.25
OS_CYLINDER: -2.00
OS_AXIS: 094
OD_AXIS: 104
OD_CYLINDER: -2.25

## 2020-06-17 ASSESSMENT — VISUAL ACUITY
OS_BCVA: 20/25-1
OD_BCVA: 20/30-1

## 2020-06-17 ASSESSMENT — AXIALLENGTH_DERIVED
OD_AL: 24.3576
OS_AL: 24.8974

## 2020-07-10 ENCOUNTER — DOCTOR'S OFFICE (OUTPATIENT)
Dept: URBAN - METROPOLITAN AREA CLINIC 125 | Facility: CLINIC | Age: 75
Setting detail: OPHTHALMOLOGY
End: 2020-07-10
Payer: COMMERCIAL

## 2020-07-10 VITALS — HEIGHT: 55 IN

## 2020-07-10 DIAGNOSIS — H43.812: ICD-10-CM

## 2020-07-10 DIAGNOSIS — H43.811: ICD-10-CM

## 2020-07-10 DIAGNOSIS — E11.3412: ICD-10-CM

## 2020-07-10 DIAGNOSIS — H04.123: ICD-10-CM

## 2020-07-10 DIAGNOSIS — E11.3411: ICD-10-CM

## 2020-07-10 PROCEDURE — 92134 CPTRZ OPH DX IMG PST SGM RTA: CPT | Performed by: OPHTHALMOLOGY

## 2020-07-10 PROCEDURE — 99214 OFFICE O/P EST MOD 30 MIN: CPT | Performed by: OPHTHALMOLOGY

## 2020-07-10 ASSESSMENT — REFRACTION_AUTOREFRACTION
OD_CYLINDER: -2.25
OS_AXIS: 094
OS_CYLINDER: -2.00
OS_SPHERE: -3.00
OD_AXIS: 104
OD_SPHERE: -1.25

## 2020-07-10 ASSESSMENT — KERATOMETRY
OS_K2POWER_DIOPTERS: 45.00
OS_K1POWER_DIOPTERS: 43.75
OD_K2POWER_DIOPTERS: 45.00
OD_K1POWER_DIOPTERS: 43.00
OS_AXISANGLE_DEGREES: 103
OD_AXISANGLE_DEGREES: 106

## 2020-07-10 ASSESSMENT — AXIALLENGTH_DERIVED
OS_AL: 24.8974
OD_AL: 24.3576

## 2020-07-10 ASSESSMENT — SUPERFICIAL PUNCTATE KERATITIS (SPK)
OD_SPK: 1+
OS_SPK: 1+

## 2020-07-10 ASSESSMENT — SPHEQUIV_DERIVED
OS_SPHEQUIV: -4
OD_SPHEQUIV: -2.375

## 2020-07-10 ASSESSMENT — CONFRONTATIONAL VISUAL FIELD TEST (CVF)
OS_FINDINGS: FULL
OD_FINDINGS: FULL

## 2020-07-10 ASSESSMENT — VISUAL ACUITY
OD_BCVA: 20/30
OS_BCVA: 20/30-1

## 2020-07-15 ENCOUNTER — DOCTOR'S OFFICE (OUTPATIENT)
Dept: URBAN - METROPOLITAN AREA CLINIC 125 | Facility: CLINIC | Age: 75
Setting detail: OPHTHALMOLOGY
End: 2020-07-15
Payer: COMMERCIAL

## 2020-07-15 DIAGNOSIS — E11.3411: ICD-10-CM

## 2020-07-15 PROCEDURE — 67028 INJECTION EYE DRUG: CPT | Performed by: OPHTHALMOLOGY

## 2020-07-15 ASSESSMENT — SPHEQUIV_DERIVED
OS_SPHEQUIV: -4
OD_SPHEQUIV: -2.375

## 2020-07-15 ASSESSMENT — REFRACTION_AUTOREFRACTION
OS_CYLINDER: -2.00
OD_AXIS: 104
OS_AXIS: 094
OD_SPHERE: -1.25
OS_SPHERE: -3.00
OD_CYLINDER: -2.25

## 2020-07-15 ASSESSMENT — AXIALLENGTH_DERIVED
OD_AL: 24.3576
OS_AL: 24.8974

## 2020-07-15 ASSESSMENT — KERATOMETRY
OS_K1POWER_DIOPTERS: 43.75
OS_K2POWER_DIOPTERS: 45.00
OD_AXISANGLE_DEGREES: 106
OD_K2POWER_DIOPTERS: 45.00
OD_K1POWER_DIOPTERS: 43.00
OS_AXISANGLE_DEGREES: 103

## 2020-07-15 ASSESSMENT — VISUAL ACUITY
OS_BCVA: 20/30-1
OD_BCVA: 20/30

## 2020-08-12 ENCOUNTER — DOCTOR'S OFFICE (OUTPATIENT)
Dept: URBAN - METROPOLITAN AREA CLINIC 125 | Facility: CLINIC | Age: 75
Setting detail: OPHTHALMOLOGY
End: 2020-08-12
Payer: COMMERCIAL

## 2020-08-12 VITALS — HEIGHT: 55 IN

## 2020-08-12 DIAGNOSIS — E11.3412: ICD-10-CM

## 2020-08-12 PROCEDURE — 67028 INJECTION EYE DRUG: CPT | Performed by: OPHTHALMOLOGY

## 2020-08-12 ASSESSMENT — SPHEQUIV_DERIVED
OS_SPHEQUIV: -4
OD_SPHEQUIV: -2.375

## 2020-08-12 ASSESSMENT — VISUAL ACUITY
OD_BCVA: 20/40
OS_BCVA: 20/30-1

## 2020-08-12 ASSESSMENT — AXIALLENGTH_DERIVED
OD_AL: 24.3576
OS_AL: 24.8974

## 2020-08-12 ASSESSMENT — REFRACTION_AUTOREFRACTION
OS_CYLINDER: -2.00
OD_AXIS: 104
OD_CYLINDER: -2.25
OS_AXIS: 094
OS_SPHERE: -3.00
OD_SPHERE: -1.25

## 2020-08-12 ASSESSMENT — KERATOMETRY
OD_AXISANGLE_DEGREES: 106
OD_K2POWER_DIOPTERS: 45.00
OS_K2POWER_DIOPTERS: 45.00
OS_AXISANGLE_DEGREES: 103
OD_K1POWER_DIOPTERS: 43.00
OS_K1POWER_DIOPTERS: 43.75

## 2020-08-14 ENCOUNTER — DOCTOR'S OFFICE (OUTPATIENT)
Dept: URBAN - METROPOLITAN AREA CLINIC 125 | Facility: CLINIC | Age: 75
Setting detail: OPHTHALMOLOGY
End: 2020-08-14
Payer: COMMERCIAL

## 2020-08-14 VITALS — HEIGHT: 55 IN

## 2020-08-14 DIAGNOSIS — E11.3411: ICD-10-CM

## 2020-08-14 DIAGNOSIS — H43.811: ICD-10-CM

## 2020-08-14 DIAGNOSIS — H43.812: ICD-10-CM

## 2020-08-14 PROCEDURE — 67028 INJECTION EYE DRUG: CPT | Performed by: OPHTHALMOLOGY

## 2020-08-14 PROCEDURE — 92134 CPTRZ OPH DX IMG PST SGM RTA: CPT | Performed by: OPHTHALMOLOGY

## 2020-08-14 ASSESSMENT — REFRACTION_AUTOREFRACTION
OS_CYLINDER: -2.00
OD_SPHERE: -1.25
OD_AXIS: 104
OS_SPHERE: -3.00
OS_AXIS: 094
OD_CYLINDER: -2.25

## 2020-08-14 ASSESSMENT — KERATOMETRY
OD_K1POWER_DIOPTERS: 43.00
OD_AXISANGLE_DEGREES: 106
OD_K2POWER_DIOPTERS: 45.00
OS_K2POWER_DIOPTERS: 45.00
OS_K1POWER_DIOPTERS: 43.75
OS_AXISANGLE_DEGREES: 103

## 2020-08-14 ASSESSMENT — SPHEQUIV_DERIVED
OS_SPHEQUIV: -4
OD_SPHEQUIV: -2.375

## 2020-08-14 ASSESSMENT — AXIALLENGTH_DERIVED
OS_AL: 24.8974
OD_AL: 24.3576

## 2020-08-14 ASSESSMENT — VISUAL ACUITY
OS_BCVA: 20/30-1
OD_BCVA: 20/25+2

## 2020-09-11 ENCOUNTER — DOCTOR'S OFFICE (OUTPATIENT)
Dept: URBAN - METROPOLITAN AREA CLINIC 125 | Facility: CLINIC | Age: 75
Setting detail: OPHTHALMOLOGY
End: 2020-09-11
Payer: COMMERCIAL

## 2020-09-11 DIAGNOSIS — E11.3411: ICD-10-CM

## 2020-09-11 DIAGNOSIS — H43.812: ICD-10-CM

## 2020-09-11 DIAGNOSIS — E11.3412: ICD-10-CM

## 2020-09-11 DIAGNOSIS — H43.811: ICD-10-CM

## 2020-09-11 PROCEDURE — 67028 INJECTION EYE DRUG: CPT | Performed by: OPHTHALMOLOGY

## 2020-09-11 PROCEDURE — 92134 CPTRZ OPH DX IMG PST SGM RTA: CPT | Performed by: OPHTHALMOLOGY

## 2020-09-11 ASSESSMENT — SPHEQUIV_DERIVED
OS_SPHEQUIV: -4
OD_SPHEQUIV: -2.375

## 2020-09-11 ASSESSMENT — AXIALLENGTH_DERIVED
OD_AL: 24.3576
OS_AL: 24.8974

## 2020-09-11 ASSESSMENT — REFRACTION_AUTOREFRACTION
OD_AXIS: 104
OS_SPHERE: -3.00
OS_AXIS: 094
OD_SPHERE: -1.25
OD_CYLINDER: -2.25
OS_CYLINDER: -2.00

## 2020-09-11 ASSESSMENT — KERATOMETRY
OD_K1POWER_DIOPTERS: 43.00
OD_AXISANGLE_DEGREES: 106
OS_K2POWER_DIOPTERS: 45.00
OS_AXISANGLE_DEGREES: 103
OS_K1POWER_DIOPTERS: 43.75
OD_K2POWER_DIOPTERS: 45.00

## 2020-09-11 ASSESSMENT — SUPERFICIAL PUNCTATE KERATITIS (SPK)
OD_SPK: 1+
OS_SPK: 1+

## 2020-09-11 ASSESSMENT — VISUAL ACUITY
OD_BCVA: 20/25-1
OS_BCVA: 20/25-2

## 2020-09-11 ASSESSMENT — CONFRONTATIONAL VISUAL FIELD TEST (CVF)
OS_FINDINGS: FULL
OD_FINDINGS: FULL

## 2020-09-16 ENCOUNTER — DOCTOR'S OFFICE (OUTPATIENT)
Dept: URBAN - METROPOLITAN AREA CLINIC 125 | Facility: CLINIC | Age: 75
Setting detail: OPHTHALMOLOGY
End: 2020-09-16
Payer: COMMERCIAL

## 2020-09-16 VITALS — HEIGHT: 55 IN

## 2020-09-16 DIAGNOSIS — H43.812: ICD-10-CM

## 2020-09-16 DIAGNOSIS — E11.3412: ICD-10-CM

## 2020-09-16 DIAGNOSIS — H43.811: ICD-10-CM

## 2020-09-16 DIAGNOSIS — E11.3411: ICD-10-CM

## 2020-09-16 PROCEDURE — 92235 FLUORESCEIN ANGRPH MLTIFRAME: CPT | Performed by: OPHTHALMOLOGY

## 2020-09-16 PROCEDURE — 67028 INJECTION EYE DRUG: CPT | Performed by: OPHTHALMOLOGY

## 2020-09-16 PROCEDURE — 92250 FUNDUS PHOTOGRAPHY W/I&R: CPT | Performed by: OPHTHALMOLOGY

## 2020-09-16 ASSESSMENT — CONFRONTATIONAL VISUAL FIELD TEST (CVF)
OD_FINDINGS: FULL
OS_FINDINGS: FULL

## 2020-09-16 ASSESSMENT — SPHEQUIV_DERIVED
OS_SPHEQUIV: -4
OD_SPHEQUIV: -2.375

## 2020-09-16 ASSESSMENT — REFRACTION_AUTOREFRACTION
OD_AXIS: 104
OS_AXIS: 094
OS_CYLINDER: -2.00
OD_CYLINDER: -2.25
OS_SPHERE: -3.00
OD_SPHERE: -1.25

## 2020-09-16 ASSESSMENT — KERATOMETRY
OS_K1POWER_DIOPTERS: 43.75
OS_AXISANGLE_DEGREES: 103
OS_K2POWER_DIOPTERS: 45.00
OD_K2POWER_DIOPTERS: 45.00
OD_K1POWER_DIOPTERS: 43.00
OD_AXISANGLE_DEGREES: 106

## 2020-09-16 ASSESSMENT — AXIALLENGTH_DERIVED
OD_AL: 24.3576
OS_AL: 24.8974

## 2020-09-16 ASSESSMENT — VISUAL ACUITY
OS_BCVA: 20/25-2
OD_BCVA: 20/30

## 2020-10-09 ENCOUNTER — DOCTOR'S OFFICE (OUTPATIENT)
Dept: URBAN - METROPOLITAN AREA CLINIC 125 | Facility: CLINIC | Age: 75
Setting detail: OPHTHALMOLOGY
End: 2020-10-09
Payer: COMMERCIAL

## 2020-10-09 DIAGNOSIS — H43.812: ICD-10-CM

## 2020-10-09 DIAGNOSIS — H43.811: ICD-10-CM

## 2020-10-09 DIAGNOSIS — E11.3412: ICD-10-CM

## 2020-10-09 DIAGNOSIS — E11.3411: ICD-10-CM

## 2020-10-09 DIAGNOSIS — H04.123: ICD-10-CM

## 2020-10-09 PROCEDURE — 92201 OPSCPY EXTND RTA DRAW UNI/BI: CPT | Performed by: OPHTHALMOLOGY

## 2020-10-09 PROCEDURE — 92014 COMPRE OPH EXAM EST PT 1/>: CPT | Performed by: OPHTHALMOLOGY

## 2020-10-09 PROCEDURE — 92134 CPTRZ OPH DX IMG PST SGM RTA: CPT | Performed by: OPHTHALMOLOGY

## 2020-10-09 ASSESSMENT — SPHEQUIV_DERIVED
OS_SPHEQUIV: -4
OD_SPHEQUIV: -2.375

## 2020-10-09 ASSESSMENT — REFRACTION_AUTOREFRACTION
OD_CYLINDER: -2.25
OS_CYLINDER: -2.00
OD_AXIS: 104
OS_AXIS: 094
OD_SPHERE: -1.25
OS_SPHERE: -3.00

## 2020-10-09 ASSESSMENT — SUPERFICIAL PUNCTATE KERATITIS (SPK)
OS_SPK: 1+
OD_SPK: 1+

## 2020-10-09 ASSESSMENT — CONFRONTATIONAL VISUAL FIELD TEST (CVF)
OS_FINDINGS: FULL
OD_FINDINGS: FULL

## 2020-10-09 ASSESSMENT — VISUAL ACUITY
OS_BCVA: 20/30+1
OD_BCVA: 20/30-1

## 2020-10-09 ASSESSMENT — AXIALLENGTH_DERIVED
OD_AL: 24.3576
OS_AL: 24.8974

## 2020-10-09 ASSESSMENT — KERATOMETRY
OS_K1POWER_DIOPTERS: 43.75
OS_K2POWER_DIOPTERS: 45.00
OD_K1POWER_DIOPTERS: 43.00
OD_AXISANGLE_DEGREES: 106
OS_AXISANGLE_DEGREES: 103
OD_K2POWER_DIOPTERS: 45.00

## 2020-10-14 ENCOUNTER — DOCTOR'S OFFICE (OUTPATIENT)
Dept: URBAN - METROPOLITAN AREA CLINIC 125 | Facility: CLINIC | Age: 75
Setting detail: OPHTHALMOLOGY
End: 2020-10-14
Payer: COMMERCIAL

## 2020-10-14 DIAGNOSIS — E11.3411: ICD-10-CM

## 2020-10-14 PROCEDURE — 67028 INJECTION EYE DRUG: CPT | Performed by: OPHTHALMOLOGY

## 2020-10-14 ASSESSMENT — AXIALLENGTH_DERIVED
OS_AL: 24.8974
OD_AL: 24.3576

## 2020-10-14 ASSESSMENT — KERATOMETRY
OS_AXISANGLE_DEGREES: 103
OS_K2POWER_DIOPTERS: 45.00
OD_K1POWER_DIOPTERS: 43.00
OD_AXISANGLE_DEGREES: 106
OS_K1POWER_DIOPTERS: 43.75
OD_K2POWER_DIOPTERS: 45.00

## 2020-10-14 ASSESSMENT — REFRACTION_AUTOREFRACTION
OS_AXIS: 094
OD_AXIS: 104
OS_SPHERE: -3.00
OS_CYLINDER: -2.00
OD_CYLINDER: -2.25
OD_SPHERE: -1.25

## 2020-10-14 ASSESSMENT — SPHEQUIV_DERIVED
OD_SPHEQUIV: -2.375
OS_SPHEQUIV: -4

## 2020-10-14 ASSESSMENT — VISUAL ACUITY
OD_BCVA: 20/30-2
OS_BCVA: 20/30-2

## 2020-10-21 ENCOUNTER — DOCTOR'S OFFICE (OUTPATIENT)
Dept: URBAN - METROPOLITAN AREA CLINIC 125 | Facility: CLINIC | Age: 75
Setting detail: OPHTHALMOLOGY
End: 2020-10-21
Payer: COMMERCIAL

## 2020-10-21 VITALS — HEIGHT: 55 IN

## 2020-10-21 DIAGNOSIS — E11.3412: ICD-10-CM

## 2020-10-21 PROCEDURE — 67028 INJECTION EYE DRUG: CPT | Performed by: OPHTHALMOLOGY

## 2020-10-21 ASSESSMENT — KERATOMETRY
OD_AXISANGLE_DEGREES: 106
OS_K1POWER_DIOPTERS: 43.75
OD_K2POWER_DIOPTERS: 45.00
OD_K1POWER_DIOPTERS: 43.00
OS_K2POWER_DIOPTERS: 45.00
OS_AXISANGLE_DEGREES: 103

## 2020-10-21 ASSESSMENT — REFRACTION_AUTOREFRACTION
OD_CYLINDER: -2.25
OS_AXIS: 094
OS_CYLINDER: -2.00
OS_SPHERE: -3.00
OD_AXIS: 104
OD_SPHERE: -1.25

## 2020-10-21 ASSESSMENT — VISUAL ACUITY
OD_BCVA: 20/30-1
OS_BCVA: 20/20

## 2020-10-21 ASSESSMENT — SPHEQUIV_DERIVED
OS_SPHEQUIV: -4
OD_SPHEQUIV: -2.375

## 2020-10-21 ASSESSMENT — AXIALLENGTH_DERIVED
OD_AL: 24.3576
OS_AL: 24.8974

## 2020-11-11 ENCOUNTER — DOCTOR'S OFFICE (OUTPATIENT)
Dept: URBAN - METROPOLITAN AREA CLINIC 125 | Facility: CLINIC | Age: 75
Setting detail: OPHTHALMOLOGY
End: 2020-11-11
Payer: COMMERCIAL

## 2020-11-11 VITALS — HEIGHT: 55 IN

## 2020-11-11 DIAGNOSIS — E11.3411: ICD-10-CM

## 2020-11-11 DIAGNOSIS — H43.811: ICD-10-CM

## 2020-11-11 DIAGNOSIS — H43.812: ICD-10-CM

## 2020-11-11 DIAGNOSIS — E11.3412: ICD-10-CM

## 2020-11-11 DIAGNOSIS — H04.122: ICD-10-CM

## 2020-11-11 DIAGNOSIS — H04.121: ICD-10-CM

## 2020-11-11 PROCEDURE — 83861 MICROFLUID ANALY TEARS: CPT | Performed by: OPHTHALMOLOGY

## 2020-11-11 PROCEDURE — 92014 COMPRE OPH EXAM EST PT 1/>: CPT | Performed by: OPHTHALMOLOGY

## 2020-11-11 PROCEDURE — 92201 OPSCPY EXTND RTA DRAW UNI/BI: CPT | Performed by: OPHTHALMOLOGY

## 2020-11-11 PROCEDURE — 92134 CPTRZ OPH DX IMG PST SGM RTA: CPT | Performed by: OPHTHALMOLOGY

## 2020-11-11 ASSESSMENT — REFRACTION_AUTOREFRACTION
OS_CYLINDER: -2.00
OS_SPHERE: -3.00
OD_CYLINDER: -2.25
OD_AXIS: 104
OS_AXIS: 094
OD_SPHERE: -1.25

## 2020-11-11 ASSESSMENT — SUPERFICIAL PUNCTATE KERATITIS (SPK)
OS_SPK: 1+
OD_SPK: 1+

## 2020-11-11 ASSESSMENT — SPHEQUIV_DERIVED
OS_SPHEQUIV: -4
OD_SPHEQUIV: -2.375

## 2020-11-11 ASSESSMENT — VISUAL ACUITY
OD_BCVA: 20/30-2
OS_BCVA: 20/25-2

## 2020-11-11 ASSESSMENT — KERATOMETRY
OS_K2POWER_DIOPTERS: 45.00
OD_K2POWER_DIOPTERS: 45.00
OD_AXISANGLE_DEGREES: 106
OD_K1POWER_DIOPTERS: 43.00
OS_K1POWER_DIOPTERS: 43.75
OS_AXISANGLE_DEGREES: 103

## 2020-11-11 ASSESSMENT — AXIALLENGTH_DERIVED
OS_AL: 24.8974
OD_AL: 24.3576

## 2020-11-11 ASSESSMENT — CONFRONTATIONAL VISUAL FIELD TEST (CVF)
OD_FINDINGS: FULL
OS_FINDINGS: FULL

## 2020-11-18 ENCOUNTER — DOCTOR'S OFFICE (OUTPATIENT)
Dept: URBAN - METROPOLITAN AREA CLINIC 125 | Facility: CLINIC | Age: 75
Setting detail: OPHTHALMOLOGY
End: 2020-11-18
Payer: COMMERCIAL

## 2020-11-18 DIAGNOSIS — E11.3411: ICD-10-CM

## 2020-11-18 PROCEDURE — 67028 INJECTION EYE DRUG: CPT | Performed by: OPHTHALMOLOGY

## 2020-11-18 ASSESSMENT — REFRACTION_AUTOREFRACTION
OS_SPHERE: -3.00
OS_AXIS: 094
OD_SPHERE: -1.25
OS_CYLINDER: -2.00
OD_CYLINDER: -2.25
OD_AXIS: 104

## 2020-11-18 ASSESSMENT — KERATOMETRY
OS_AXISANGLE_DEGREES: 103
OS_K1POWER_DIOPTERS: 43.75
OS_K2POWER_DIOPTERS: 45.00
OD_K2POWER_DIOPTERS: 45.00
OD_AXISANGLE_DEGREES: 106
OD_K1POWER_DIOPTERS: 43.00

## 2020-11-18 ASSESSMENT — AXIALLENGTH_DERIVED
OS_AL: 24.8974
OD_AL: 24.3576

## 2020-11-18 ASSESSMENT — SPHEQUIV_DERIVED
OD_SPHEQUIV: -2.375
OS_SPHEQUIV: -4

## 2020-11-18 ASSESSMENT — VISUAL ACUITY
OD_BCVA: 20/30-1
OS_BCVA: 20/30

## 2020-11-25 ENCOUNTER — DOCTOR'S OFFICE (OUTPATIENT)
Dept: URBAN - METROPOLITAN AREA CLINIC 125 | Facility: CLINIC | Age: 75
Setting detail: OPHTHALMOLOGY
End: 2020-11-25
Payer: COMMERCIAL

## 2020-11-25 DIAGNOSIS — E11.3412: ICD-10-CM

## 2020-11-25 PROCEDURE — 67028 INJECTION EYE DRUG: CPT | Performed by: OPHTHALMOLOGY

## 2020-11-25 ASSESSMENT — CONFRONTATIONAL VISUAL FIELD TEST (CVF)
OD_FINDINGS: FULL
OS_FINDINGS: FULL

## 2020-11-25 ASSESSMENT — AXIALLENGTH_DERIVED
OS_AL: 24.8974
OD_AL: 24.3576

## 2020-11-25 ASSESSMENT — VISUAL ACUITY
OS_BCVA: 20/30-1
OD_BCVA: 20/30-2

## 2020-11-25 ASSESSMENT — REFRACTION_AUTOREFRACTION
OD_AXIS: 104
OS_CYLINDER: -2.00
OD_CYLINDER: -2.25
OS_SPHERE: -3.00
OD_SPHERE: -1.25
OS_AXIS: 094

## 2020-11-25 ASSESSMENT — SPHEQUIV_DERIVED
OS_SPHEQUIV: -4
OD_SPHEQUIV: -2.375

## 2020-12-09 ENCOUNTER — DOCTOR'S OFFICE (OUTPATIENT)
Dept: URBAN - METROPOLITAN AREA CLINIC 125 | Facility: CLINIC | Age: 75
Setting detail: OPHTHALMOLOGY
End: 2020-12-09
Payer: COMMERCIAL

## 2020-12-09 DIAGNOSIS — H43.811: ICD-10-CM

## 2020-12-09 DIAGNOSIS — H04.121: ICD-10-CM

## 2020-12-09 DIAGNOSIS — H04.122: ICD-10-CM

## 2020-12-09 DIAGNOSIS — H43.812: ICD-10-CM

## 2020-12-09 DIAGNOSIS — E11.3413: ICD-10-CM

## 2020-12-09 PROCEDURE — 92134 CPTRZ OPH DX IMG PST SGM RTA: CPT | Performed by: OPHTHALMOLOGY

## 2020-12-09 PROCEDURE — 92201 OPSCPY EXTND RTA DRAW UNI/BI: CPT | Performed by: OPHTHALMOLOGY

## 2020-12-09 PROCEDURE — 92014 COMPRE OPH EXAM EST PT 1/>: CPT | Performed by: OPHTHALMOLOGY

## 2020-12-09 ASSESSMENT — SUPERFICIAL PUNCTATE KERATITIS (SPK)
OS_SPK: 1+
OD_SPK: 1+

## 2020-12-09 ASSESSMENT — CONFRONTATIONAL VISUAL FIELD TEST (CVF)
OS_FINDINGS: FULL
OD_FINDINGS: FULL

## 2020-12-16 ENCOUNTER — DOCTOR'S OFFICE (OUTPATIENT)
Dept: URBAN - METROPOLITAN AREA CLINIC 125 | Facility: CLINIC | Age: 75
Setting detail: OPHTHALMOLOGY
End: 2020-12-16
Payer: COMMERCIAL

## 2020-12-16 DIAGNOSIS — E11.3411: ICD-10-CM

## 2020-12-16 PROCEDURE — 67028 INJECTION EYE DRUG: CPT | Performed by: OPHTHALMOLOGY

## 2020-12-16 ASSESSMENT — KERATOMETRY
OS_AXISANGLE_DEGREES: 103
OD_K1POWER_DIOPTERS: 43.00
OS_AXISANGLE_DEGREES: 103
OS_K2POWER_DIOPTERS: 45.00
OS_K1POWER_DIOPTERS: 43.75
OD_K2POWER_DIOPTERS: 45.00
OD_AXISANGLE_DEGREES: 106
OS_K2POWER_DIOPTERS: 45.00
OS_K1POWER_DIOPTERS: 43.75
OD_AXISANGLE_DEGREES: 106
OD_K2POWER_DIOPTERS: 45.00
OD_K1POWER_DIOPTERS: 43.00

## 2020-12-16 ASSESSMENT — REFRACTION_AUTOREFRACTION
OD_SPHERE: -1.25
OD_AXIS: 104
OS_CYLINDER: -2.00
OD_CYLINDER: -2.25
OS_SPHERE: -3.00
OS_CYLINDER: -2.00
OS_SPHERE: -3.00
OS_AXIS: 094
OD_AXIS: 104
OD_CYLINDER: -2.25
OD_SPHERE: -1.25
OS_AXIS: 094

## 2020-12-16 ASSESSMENT — VISUAL ACUITY
OS_BCVA: 20/20-1
OD_BCVA: 20/25-2
OS_BCVA: 20/30+2
OD_BCVA: 20/30-1

## 2020-12-16 ASSESSMENT — AXIALLENGTH_DERIVED
OS_AL: 24.8974
OD_AL: 24.3576
OD_AL: 24.3576
OS_AL: 24.8974

## 2020-12-16 ASSESSMENT — SPHEQUIV_DERIVED
OD_SPHEQUIV: -2.375
OS_SPHEQUIV: -4
OS_SPHEQUIV: -4
OD_SPHEQUIV: -2.375

## 2021-01-06 ENCOUNTER — DOCTOR'S OFFICE (OUTPATIENT)
Dept: URBAN - METROPOLITAN AREA CLINIC 125 | Facility: CLINIC | Age: 76
Setting detail: OPHTHALMOLOGY
End: 2021-01-06
Payer: COMMERCIAL

## 2021-01-06 VITALS — HEIGHT: 55 IN

## 2021-01-06 DIAGNOSIS — E11.3412: ICD-10-CM

## 2021-01-06 PROCEDURE — 67028 INJECTION EYE DRUG: CPT | Performed by: OPHTHALMOLOGY

## 2021-01-06 ASSESSMENT — REFRACTION_AUTOREFRACTION
OS_AXIS: 094
OS_SPHERE: -3.00
OD_CYLINDER: -2.25
OS_CYLINDER: -2.00
OD_SPHERE: -1.25
OD_AXIS: 104

## 2021-01-06 ASSESSMENT — SPHEQUIV_DERIVED
OD_SPHEQUIV: -2.375
OS_SPHEQUIV: -4

## 2021-01-06 ASSESSMENT — KERATOMETRY
OS_K1POWER_DIOPTERS: 43.75
OD_K2POWER_DIOPTERS: 45.00
OS_AXISANGLE_DEGREES: 103
OS_K2POWER_DIOPTERS: 45.00
OD_AXISANGLE_DEGREES: 106
OD_K1POWER_DIOPTERS: 43.00

## 2021-01-06 ASSESSMENT — VISUAL ACUITY
OS_BCVA: 20/20-1
OD_BCVA: 20/25-2

## 2021-01-06 ASSESSMENT — AXIALLENGTH_DERIVED
OD_AL: 24.3576
OS_AL: 24.8974

## 2021-01-13 ENCOUNTER — DOCTOR'S OFFICE (OUTPATIENT)
Dept: URBAN - METROPOLITAN AREA CLINIC 125 | Facility: CLINIC | Age: 76
Setting detail: OPHTHALMOLOGY
End: 2021-01-13
Payer: COMMERCIAL

## 2021-01-13 VITALS — HEIGHT: 55 IN

## 2021-01-13 DIAGNOSIS — H04.121: ICD-10-CM

## 2021-01-13 DIAGNOSIS — E11.3313: ICD-10-CM

## 2021-01-13 DIAGNOSIS — H43.812: ICD-10-CM

## 2021-01-13 DIAGNOSIS — H43.811: ICD-10-CM

## 2021-01-13 DIAGNOSIS — H04.122: ICD-10-CM

## 2021-01-13 PROCEDURE — 92201 OPSCPY EXTND RTA DRAW UNI/BI: CPT | Performed by: OPHTHALMOLOGY

## 2021-01-13 PROCEDURE — 92014 COMPRE OPH EXAM EST PT 1/>: CPT | Performed by: OPHTHALMOLOGY

## 2021-01-13 PROCEDURE — 92134 CPTRZ OPH DX IMG PST SGM RTA: CPT | Performed by: OPHTHALMOLOGY

## 2021-01-13 ASSESSMENT — SUPERFICIAL PUNCTATE KERATITIS (SPK)
OD_SPK: 1+
OS_SPK: 1+

## 2021-01-13 ASSESSMENT — VISUAL ACUITY
OD_BCVA: 20/30-1
OS_BCVA: 20/30-1

## 2021-01-13 ASSESSMENT — REFRACTION_AUTOREFRACTION
OD_AXIS: 104
OS_SPHERE: -3.00
OS_AXIS: 094
OD_CYLINDER: -2.25
OS_CYLINDER: -2.00
OD_SPHERE: -1.25

## 2021-01-13 ASSESSMENT — SPHEQUIV_DERIVED
OS_SPHEQUIV: -4
OD_SPHEQUIV: -2.375

## 2021-01-13 ASSESSMENT — AXIALLENGTH_DERIVED
OD_AL: 24.3576
OS_AL: 24.8974

## 2021-01-13 ASSESSMENT — CONFRONTATIONAL VISUAL FIELD TEST (CVF)
OD_FINDINGS: FULL
OS_FINDINGS: FULL

## 2021-01-22 ENCOUNTER — DOCTOR'S OFFICE (OUTPATIENT)
Dept: URBAN - METROPOLITAN AREA CLINIC 125 | Facility: CLINIC | Age: 76
Setting detail: OPHTHALMOLOGY
End: 2021-01-22
Payer: COMMERCIAL

## 2021-01-22 VITALS — HEIGHT: 55 IN

## 2021-01-22 DIAGNOSIS — E11.3311: ICD-10-CM

## 2021-01-22 DIAGNOSIS — E11.3312: ICD-10-CM

## 2021-01-22 DIAGNOSIS — H43.811: ICD-10-CM

## 2021-01-22 DIAGNOSIS — H43.812: ICD-10-CM

## 2021-01-22 PROCEDURE — 92235 FLUORESCEIN ANGRPH MLTIFRAME: CPT | Performed by: OPHTHALMOLOGY

## 2021-01-22 PROCEDURE — 67028 INJECTION EYE DRUG: CPT | Performed by: OPHTHALMOLOGY

## 2021-01-22 PROCEDURE — 92250 FUNDUS PHOTOGRAPHY W/I&R: CPT | Performed by: OPHTHALMOLOGY

## 2021-01-22 ASSESSMENT — SPHEQUIV_DERIVED
OD_SPHEQUIV: -2.375
OS_SPHEQUIV: -4

## 2021-01-22 ASSESSMENT — VISUAL ACUITY
OS_BCVA: 20/30-2
OD_BCVA: 20/40

## 2021-01-22 ASSESSMENT — KERATOMETRY
OD_K1POWER_DIOPTERS: 43.00
OD_AXISANGLE_DEGREES: 106
OS_AXISANGLE_DEGREES: 103
OD_K2POWER_DIOPTERS: 45.00
OS_K2POWER_DIOPTERS: 45.00
OS_K1POWER_DIOPTERS: 43.75

## 2021-01-22 ASSESSMENT — REFRACTION_AUTOREFRACTION
OD_AXIS: 104
OD_SPHERE: -1.25
OD_CYLINDER: -2.25
OS_SPHERE: -3.00
OS_CYLINDER: -2.00
OS_AXIS: 094

## 2021-01-22 ASSESSMENT — AXIALLENGTH_DERIVED
OS_AL: 24.8974
OD_AL: 24.3576

## 2021-01-22 ASSESSMENT — CONFRONTATIONAL VISUAL FIELD TEST (CVF)
OD_FINDINGS: FULL
OS_FINDINGS: FULL

## 2021-03-03 ENCOUNTER — DOCTOR'S OFFICE (OUTPATIENT)
Dept: URBAN - METROPOLITAN AREA CLINIC 125 | Facility: CLINIC | Age: 76
Setting detail: OPHTHALMOLOGY
End: 2021-03-03
Payer: COMMERCIAL

## 2021-03-03 DIAGNOSIS — E11.3312: ICD-10-CM

## 2021-03-03 PROCEDURE — 67028 INJECTION EYE DRUG: CPT | Performed by: OPHTHALMOLOGY

## 2021-03-03 ASSESSMENT — KERATOMETRY
OS_K2POWER_DIOPTERS: 45.00
OS_AXISANGLE_DEGREES: 103
OD_K2POWER_DIOPTERS: 45.00
OD_K1POWER_DIOPTERS: 43.00
OD_AXISANGLE_DEGREES: 106
OS_K1POWER_DIOPTERS: 43.75

## 2021-03-03 ASSESSMENT — REFRACTION_AUTOREFRACTION
OS_SPHERE: -3.00
OD_AXIS: 104
OD_CYLINDER: -2.25
OS_AXIS: 094
OS_CYLINDER: -2.00
OD_SPHERE: -1.25

## 2021-03-03 ASSESSMENT — AXIALLENGTH_DERIVED
OS_AL: 24.8974
OD_AL: 24.3576

## 2021-03-03 ASSESSMENT — SPHEQUIV_DERIVED
OS_SPHEQUIV: -4
OD_SPHEQUIV: -2.375

## 2021-03-03 ASSESSMENT — VISUAL ACUITY
OS_BCVA: 20/30-2
OD_BCVA: 20/40

## 2021-03-17 ENCOUNTER — DOCTOR'S OFFICE (OUTPATIENT)
Dept: URBAN - METROPOLITAN AREA CLINIC 125 | Facility: CLINIC | Age: 76
Setting detail: OPHTHALMOLOGY
End: 2021-03-17
Payer: COMMERCIAL

## 2021-03-17 DIAGNOSIS — H04.122: ICD-10-CM

## 2021-03-17 DIAGNOSIS — H43.811: ICD-10-CM

## 2021-03-17 DIAGNOSIS — E11.3413: ICD-10-CM

## 2021-03-17 DIAGNOSIS — H04.121: ICD-10-CM

## 2021-03-17 DIAGNOSIS — H43.812: ICD-10-CM

## 2021-03-17 PROCEDURE — 92201 OPSCPY EXTND RTA DRAW UNI/BI: CPT | Performed by: OPHTHALMOLOGY

## 2021-03-17 PROCEDURE — 92014 COMPRE OPH EXAM EST PT 1/>: CPT | Performed by: OPHTHALMOLOGY

## 2021-03-17 PROCEDURE — 92134 CPTRZ OPH DX IMG PST SGM RTA: CPT | Performed by: OPHTHALMOLOGY

## 2021-03-17 ASSESSMENT — VISUAL ACUITY
OS_BCVA: 20/25
OD_BCVA: 20/25

## 2021-03-17 ASSESSMENT — REFRACTION_AUTOREFRACTION
OD_SPHERE: -1.25
OS_SPHERE: -3.00
OD_AXIS: 104
OS_AXIS: 094
OS_CYLINDER: -2.00
OD_CYLINDER: -2.25

## 2021-03-17 ASSESSMENT — KERATOMETRY
OD_K1POWER_DIOPTERS: 43.00
OS_K2POWER_DIOPTERS: 45.00
OD_AXISANGLE_DEGREES: 106
OS_K1POWER_DIOPTERS: 43.75
OS_AXISANGLE_DEGREES: 103
OD_K2POWER_DIOPTERS: 45.00

## 2021-03-17 ASSESSMENT — CONFRONTATIONAL VISUAL FIELD TEST (CVF)
OD_FINDINGS: FULL
OS_FINDINGS: FULL

## 2021-03-17 ASSESSMENT — SUPERFICIAL PUNCTATE KERATITIS (SPK)
OS_SPK: 1+
OD_SPK: 1+

## 2021-03-17 ASSESSMENT — AXIALLENGTH_DERIVED
OS_AL: 24.8974
OD_AL: 24.3576

## 2021-03-17 ASSESSMENT — SPHEQUIV_DERIVED
OD_SPHEQUIV: -2.375
OS_SPHEQUIV: -4

## 2021-04-07 ENCOUNTER — DOCTOR'S OFFICE (OUTPATIENT)
Dept: URBAN - METROPOLITAN AREA CLINIC 125 | Facility: CLINIC | Age: 76
Setting detail: OPHTHALMOLOGY
End: 2021-04-07
Payer: COMMERCIAL

## 2021-04-07 DIAGNOSIS — E11.3411: ICD-10-CM

## 2021-04-07 PROCEDURE — 67028 INJECTION EYE DRUG: CPT | Performed by: OPHTHALMOLOGY

## 2021-04-07 ASSESSMENT — SPHEQUIV_DERIVED
OD_SPHEQUIV: -2.375
OS_SPHEQUIV: -4

## 2021-04-07 ASSESSMENT — REFRACTION_AUTOREFRACTION
OD_CYLINDER: -2.25
OD_AXIS: 104
OS_SPHERE: -3.00
OS_CYLINDER: -2.00
OD_SPHERE: -1.25
OS_AXIS: 094

## 2021-04-07 ASSESSMENT — KERATOMETRY
OS_AXISANGLE_DEGREES: 103
OD_K1POWER_DIOPTERS: 43.00
OS_K1POWER_DIOPTERS: 43.75
OD_K2POWER_DIOPTERS: 45.00
OD_AXISANGLE_DEGREES: 106
OS_K2POWER_DIOPTERS: 45.00

## 2021-04-07 ASSESSMENT — VISUAL ACUITY
OS_BCVA: 20/25+1
OD_BCVA: 20/30+1

## 2021-04-07 ASSESSMENT — AXIALLENGTH_DERIVED
OS_AL: 24.8974
OD_AL: 24.3576

## 2021-04-08 DIAGNOSIS — Z23 ENCOUNTER FOR IMMUNIZATION: ICD-10-CM

## 2021-04-09 ENCOUNTER — DOCTOR'S OFFICE (OUTPATIENT)
Dept: URBAN - METROPOLITAN AREA CLINIC 125 | Facility: CLINIC | Age: 76
Setting detail: OPHTHALMOLOGY
End: 2021-04-09
Payer: COMMERCIAL

## 2021-04-09 DIAGNOSIS — E11.3412: ICD-10-CM

## 2021-04-09 PROCEDURE — 67028 INJECTION EYE DRUG: CPT | Performed by: OPHTHALMOLOGY

## 2021-04-09 ASSESSMENT — AXIALLENGTH_DERIVED
OS_AL: 24.8974
OD_AL: 24.3576

## 2021-04-09 ASSESSMENT — VISUAL ACUITY
OS_BCVA: 20/30-2
OD_BCVA: 20/30-1

## 2021-04-09 ASSESSMENT — REFRACTION_AUTOREFRACTION
OD_CYLINDER: -2.25
OD_AXIS: 104
OS_SPHERE: -3.00
OS_AXIS: 094
OS_CYLINDER: -2.00
OD_SPHERE: -1.25

## 2021-04-09 ASSESSMENT — KERATOMETRY
OD_AXISANGLE_DEGREES: 106
OS_AXISANGLE_DEGREES: 103
OS_K1POWER_DIOPTERS: 43.75
OD_K1POWER_DIOPTERS: 43.00
OD_K2POWER_DIOPTERS: 45.00
OS_K2POWER_DIOPTERS: 45.00

## 2021-04-09 ASSESSMENT — SPHEQUIV_DERIVED
OD_SPHEQUIV: -2.375
OS_SPHEQUIV: -4

## 2021-04-14 ENCOUNTER — DOCTOR'S OFFICE (OUTPATIENT)
Dept: URBAN - METROPOLITAN AREA CLINIC 125 | Facility: CLINIC | Age: 76
Setting detail: OPHTHALMOLOGY
End: 2021-04-14
Payer: COMMERCIAL

## 2021-04-14 DIAGNOSIS — E11.3411: ICD-10-CM

## 2021-04-14 DIAGNOSIS — H04.121: ICD-10-CM

## 2021-04-14 DIAGNOSIS — E11.3412: ICD-10-CM

## 2021-04-14 DIAGNOSIS — H43.812: ICD-10-CM

## 2021-04-14 DIAGNOSIS — H04.122: ICD-10-CM

## 2021-04-14 DIAGNOSIS — H43.811: ICD-10-CM

## 2021-04-14 PROCEDURE — 92014 COMPRE OPH EXAM EST PT 1/>: CPT | Performed by: OPHTHALMOLOGY

## 2021-04-14 PROCEDURE — 92134 CPTRZ OPH DX IMG PST SGM RTA: CPT | Performed by: OPHTHALMOLOGY

## 2021-04-14 ASSESSMENT — SUPERFICIAL PUNCTATE KERATITIS (SPK)
OS_SPK: 1+
OD_SPK: 1+

## 2021-04-14 ASSESSMENT — SPHEQUIV_DERIVED
OS_SPHEQUIV: -4
OD_SPHEQUIV: -2.375

## 2021-04-14 ASSESSMENT — REFRACTION_AUTOREFRACTION
OS_AXIS: 094
OD_SPHERE: -1.25
OS_CYLINDER: -2.00
OD_CYLINDER: -2.25
OD_AXIS: 104
OS_SPHERE: -3.00

## 2021-04-14 ASSESSMENT — VISUAL ACUITY
OS_BCVA: 20/30-1
OD_BCVA: 20/30-1

## 2021-04-14 ASSESSMENT — CONFRONTATIONAL VISUAL FIELD TEST (CVF)
OS_FINDINGS: FULL
OD_FINDINGS: FULL

## 2021-04-14 ASSESSMENT — KERATOMETRY
OS_K2POWER_DIOPTERS: 45.00
OS_K1POWER_DIOPTERS: 43.75
OD_AXISANGLE_DEGREES: 106
OD_K1POWER_DIOPTERS: 43.00
OD_K2POWER_DIOPTERS: 45.00
OS_AXISANGLE_DEGREES: 103

## 2021-04-14 ASSESSMENT — AXIALLENGTH_DERIVED
OD_AL: 24.3576
OS_AL: 24.8974

## 2021-05-05 ENCOUNTER — DOCTOR'S OFFICE (OUTPATIENT)
Dept: URBAN - METROPOLITAN AREA CLINIC 125 | Facility: CLINIC | Age: 76
Setting detail: OPHTHALMOLOGY
End: 2021-05-05
Payer: COMMERCIAL

## 2021-05-05 DIAGNOSIS — E11.3411: ICD-10-CM

## 2021-05-05 PROCEDURE — 67028 INJECTION EYE DRUG: CPT | Performed by: OPHTHALMOLOGY

## 2021-05-05 ASSESSMENT — KERATOMETRY
OS_K1POWER_DIOPTERS: 43.75
OD_K1POWER_DIOPTERS: 43.00
OD_K2POWER_DIOPTERS: 45.00
OS_AXISANGLE_DEGREES: 103
OD_AXISANGLE_DEGREES: 106
OS_K2POWER_DIOPTERS: 45.00

## 2021-05-05 ASSESSMENT — CONFRONTATIONAL VISUAL FIELD TEST (CVF)
OD_FINDINGS: FULL
OS_FINDINGS: FULL

## 2021-05-05 ASSESSMENT — VISUAL ACUITY
OD_BCVA: 20/40+1
OS_BCVA: 20/25-1

## 2021-05-05 ASSESSMENT — REFRACTION_AUTOREFRACTION
OS_CYLINDER: -2.00
OS_SPHERE: -3.00
OD_AXIS: 104
OS_AXIS: 094
OD_SPHERE: -1.25
OD_CYLINDER: -2.25

## 2021-05-05 ASSESSMENT — SPHEQUIV_DERIVED
OD_SPHEQUIV: -2.375
OS_SPHEQUIV: -4

## 2021-05-05 ASSESSMENT — AXIALLENGTH_DERIVED
OD_AL: 24.3576
OS_AL: 24.8974

## 2021-05-12 ENCOUNTER — DOCTOR'S OFFICE (OUTPATIENT)
Dept: URBAN - METROPOLITAN AREA CLINIC 125 | Facility: CLINIC | Age: 76
Setting detail: OPHTHALMOLOGY
End: 2021-05-12
Payer: COMMERCIAL

## 2021-05-12 DIAGNOSIS — E11.3412: ICD-10-CM

## 2021-05-12 PROCEDURE — 67028 INJECTION EYE DRUG: CPT | Performed by: OPHTHALMOLOGY

## 2021-05-12 ASSESSMENT — REFRACTION_AUTOREFRACTION
OS_CYLINDER: -2.00
OS_AXIS: 094
OS_SPHERE: -3.00
OD_CYLINDER: -2.25
OD_SPHERE: -1.25
OD_AXIS: 104

## 2021-05-12 ASSESSMENT — AXIALLENGTH_DERIVED
OD_AL: 24.3576
OS_AL: 24.8974

## 2021-05-12 ASSESSMENT — KERATOMETRY
OD_K2POWER_DIOPTERS: 45.00
OD_AXISANGLE_DEGREES: 106
OS_K1POWER_DIOPTERS: 43.75
OD_K1POWER_DIOPTERS: 43.00
OS_K2POWER_DIOPTERS: 45.00
OS_AXISANGLE_DEGREES: 103

## 2021-05-12 ASSESSMENT — VISUAL ACUITY
OS_BCVA: 20/30-1
OD_BCVA: 20/40+1

## 2021-05-12 ASSESSMENT — SPHEQUIV_DERIVED
OD_SPHEQUIV: -2.375
OS_SPHEQUIV: -4

## 2021-06-16 ENCOUNTER — DOCTOR'S OFFICE (OUTPATIENT)
Dept: URBAN - METROPOLITAN AREA CLINIC 125 | Facility: CLINIC | Age: 76
Setting detail: OPHTHALMOLOGY
End: 2021-06-16
Payer: COMMERCIAL

## 2021-06-16 VITALS — HEIGHT: 55 IN

## 2021-06-16 DIAGNOSIS — E11.3411: ICD-10-CM

## 2021-06-16 DIAGNOSIS — H43.812: ICD-10-CM

## 2021-06-16 DIAGNOSIS — E11.3412: ICD-10-CM

## 2021-06-16 DIAGNOSIS — H49.21: ICD-10-CM

## 2021-06-16 DIAGNOSIS — H04.122: ICD-10-CM

## 2021-06-16 DIAGNOSIS — H04.121: ICD-10-CM

## 2021-06-16 DIAGNOSIS — H43.811: ICD-10-CM

## 2021-06-16 PROCEDURE — 92201 OPSCPY EXTND RTA DRAW UNI/BI: CPT | Performed by: OPHTHALMOLOGY

## 2021-06-16 PROCEDURE — 92014 COMPRE OPH EXAM EST PT 1/>: CPT | Performed by: OPHTHALMOLOGY

## 2021-06-16 PROCEDURE — 92134 CPTRZ OPH DX IMG PST SGM RTA: CPT | Performed by: OPHTHALMOLOGY

## 2021-06-16 ASSESSMENT — SUPERFICIAL PUNCTATE KERATITIS (SPK)
OD_SPK: 1+
OS_SPK: 1+

## 2021-06-16 ASSESSMENT — KERATOMETRY
OS_K2POWER_DIOPTERS: 45.00
OS_AXISANGLE_DEGREES: 103
OD_K1POWER_DIOPTERS: 43.00
OS_K1POWER_DIOPTERS: 43.75
OD_K2POWER_DIOPTERS: 45.00
OD_AXISANGLE_DEGREES: 106

## 2021-06-16 ASSESSMENT — REFRACTION_AUTOREFRACTION
OD_SPHERE: -1.25
OD_CYLINDER: -2.25
OS_SPHERE: -3.00
OD_AXIS: 104
OS_AXIS: 094
OS_CYLINDER: -2.00

## 2021-06-16 ASSESSMENT — CONFRONTATIONAL VISUAL FIELD TEST (CVF)
OS_FINDINGS: FULL
OD_FINDINGS: FULL

## 2021-06-16 ASSESSMENT — VISUAL ACUITY
OS_BCVA: 20/25-1
OD_BCVA: 20/30-2

## 2021-06-16 ASSESSMENT — AXIALLENGTH_DERIVED
OD_AL: 24.3576
OS_AL: 24.8974

## 2021-06-16 ASSESSMENT — SPHEQUIV_DERIVED
OD_SPHEQUIV: -2.375
OS_SPHEQUIV: -4

## 2021-07-21 ENCOUNTER — DOCTOR'S OFFICE (OUTPATIENT)
Dept: URBAN - METROPOLITAN AREA CLINIC 125 | Facility: CLINIC | Age: 76
Setting detail: OPHTHALMOLOGY
End: 2021-07-21
Payer: COMMERCIAL

## 2021-07-21 DIAGNOSIS — H43.811: ICD-10-CM

## 2021-07-21 DIAGNOSIS — E11.3411: ICD-10-CM

## 2021-07-21 DIAGNOSIS — E11.3412: ICD-10-CM

## 2021-07-21 DIAGNOSIS — H43.812: ICD-10-CM

## 2021-07-21 PROCEDURE — 92235 FLUORESCEIN ANGRPH MLTIFRAME: CPT | Performed by: OPHTHALMOLOGY

## 2021-07-21 PROCEDURE — 92250 FUNDUS PHOTOGRAPHY W/I&R: CPT | Performed by: OPHTHALMOLOGY

## 2021-07-21 PROCEDURE — 67028 INJECTION EYE DRUG: CPT | Performed by: OPHTHALMOLOGY

## 2021-07-21 ASSESSMENT — KERATOMETRY
OD_K2POWER_DIOPTERS: 45.00
OD_AXISANGLE_DEGREES: 106
OS_AXISANGLE_DEGREES: 103
OD_K1POWER_DIOPTERS: 43.00
OS_K2POWER_DIOPTERS: 45.00
OS_K1POWER_DIOPTERS: 43.75

## 2021-07-21 ASSESSMENT — AXIALLENGTH_DERIVED
OD_AL: 24.3576
OS_AL: 24.8974

## 2021-07-21 ASSESSMENT — SPHEQUIV_DERIVED
OS_SPHEQUIV: -4
OD_SPHEQUIV: -2.375

## 2021-07-21 ASSESSMENT — REFRACTION_AUTOREFRACTION
OS_CYLINDER: -2.00
OD_AXIS: 104
OS_SPHERE: -3.00
OD_SPHERE: -1.25
OS_AXIS: 094
OD_CYLINDER: -2.25

## 2021-07-21 ASSESSMENT — VISUAL ACUITY
OD_BCVA: 20/25+2
OS_BCVA: 20/25+1

## 2021-07-23 ENCOUNTER — DOCTOR'S OFFICE (OUTPATIENT)
Dept: URBAN - METROPOLITAN AREA CLINIC 125 | Facility: CLINIC | Age: 76
Setting detail: OPHTHALMOLOGY
End: 2021-07-23
Payer: COMMERCIAL

## 2021-07-23 VITALS — HEIGHT: 55 IN

## 2021-07-23 DIAGNOSIS — E11.3412: ICD-10-CM

## 2021-07-23 PROCEDURE — 67028 INJECTION EYE DRUG: CPT | Performed by: OPHTHALMOLOGY

## 2021-07-23 ASSESSMENT — KERATOMETRY
OS_K1POWER_DIOPTERS: 43.75
OD_K2POWER_DIOPTERS: 45.00
OS_AXISANGLE_DEGREES: 103
OD_AXISANGLE_DEGREES: 106
OD_K1POWER_DIOPTERS: 43.00
OS_K2POWER_DIOPTERS: 45.00

## 2021-07-23 ASSESSMENT — REFRACTION_AUTOREFRACTION
OS_CYLINDER: -2.00
OD_SPHERE: -1.25
OD_CYLINDER: -2.25
OD_AXIS: 104
OS_SPHERE: -3.00
OS_AXIS: 094

## 2021-07-23 ASSESSMENT — SPHEQUIV_DERIVED
OS_SPHEQUIV: -4
OD_SPHEQUIV: -2.375

## 2021-07-23 ASSESSMENT — AXIALLENGTH_DERIVED
OD_AL: 24.3576
OS_AL: 24.8974

## 2021-07-23 ASSESSMENT — VISUAL ACUITY
OD_BCVA: 20/30-2
OS_BCVA: 20/25+1

## 2021-08-04 ENCOUNTER — DOCTOR'S OFFICE (OUTPATIENT)
Dept: URBAN - METROPOLITAN AREA CLINIC 125 | Facility: CLINIC | Age: 76
Setting detail: OPHTHALMOLOGY
End: 2021-08-04
Payer: COMMERCIAL

## 2021-08-04 DIAGNOSIS — H43.811: ICD-10-CM

## 2021-08-04 DIAGNOSIS — H04.121: ICD-10-CM

## 2021-08-04 DIAGNOSIS — H04.122: ICD-10-CM

## 2021-08-04 DIAGNOSIS — H43.812: ICD-10-CM

## 2021-08-04 DIAGNOSIS — E11.3413: ICD-10-CM

## 2021-08-04 PROCEDURE — 92201 OPSCPY EXTND RTA DRAW UNI/BI: CPT | Performed by: OPHTHALMOLOGY

## 2021-08-04 PROCEDURE — 92134 CPTRZ OPH DX IMG PST SGM RTA: CPT | Performed by: OPHTHALMOLOGY

## 2021-08-04 PROCEDURE — 92014 COMPRE OPH EXAM EST PT 1/>: CPT | Performed by: OPHTHALMOLOGY

## 2021-08-04 ASSESSMENT — CONFRONTATIONAL VISUAL FIELD TEST (CVF)
OD_FINDINGS: FULL
OS_FINDINGS: FULL

## 2021-08-04 ASSESSMENT — REFRACTION_AUTOREFRACTION
OD_AXIS: 104
OD_SPHERE: -1.25
OS_CYLINDER: -2.00
OD_CYLINDER: -2.25
OS_SPHERE: -3.00
OS_AXIS: 094

## 2021-08-04 ASSESSMENT — SUPERFICIAL PUNCTATE KERATITIS (SPK)
OS_SPK: 1+
OD_SPK: 1+

## 2021-08-04 ASSESSMENT — VISUAL ACUITY
OS_BCVA: 20/40+1
OD_BCVA: 20/30-2

## 2021-08-04 ASSESSMENT — KERATOMETRY
OS_AXISANGLE_DEGREES: 103
OD_K1POWER_DIOPTERS: 43.00
OS_K1POWER_DIOPTERS: 43.75
OD_AXISANGLE_DEGREES: 106
OS_K2POWER_DIOPTERS: 45.00
OD_K2POWER_DIOPTERS: 45.00

## 2021-08-04 ASSESSMENT — AXIALLENGTH_DERIVED
OD_AL: 24.3576
OS_AL: 24.8974

## 2021-08-04 ASSESSMENT — SPHEQUIV_DERIVED
OD_SPHEQUIV: -2.375
OS_SPHEQUIV: -4

## 2021-08-18 ENCOUNTER — DOCTOR'S OFFICE (OUTPATIENT)
Dept: URBAN - METROPOLITAN AREA CLINIC 125 | Facility: CLINIC | Age: 76
Setting detail: OPHTHALMOLOGY
End: 2021-08-18
Payer: COMMERCIAL

## 2021-08-18 DIAGNOSIS — E11.3411: ICD-10-CM

## 2021-08-18 PROCEDURE — 67028 INJECTION EYE DRUG: CPT | Performed by: OPHTHALMOLOGY

## 2021-08-18 ASSESSMENT — REFRACTION_AUTOREFRACTION
OD_AXIS: 104
OS_CYLINDER: -2.00
OD_CYLINDER: -2.25
OS_SPHERE: -3.00
OS_AXIS: 094
OD_SPHERE: -1.25

## 2021-08-18 ASSESSMENT — VISUAL ACUITY
OS_BCVA: 20/40+1
OD_BCVA: 20/30-2

## 2021-08-18 ASSESSMENT — KERATOMETRY
OD_K2POWER_DIOPTERS: 45.00
OS_K1POWER_DIOPTERS: 43.75
OD_AXISANGLE_DEGREES: 106
OS_AXISANGLE_DEGREES: 103
OS_K2POWER_DIOPTERS: 45.00
OD_K1POWER_DIOPTERS: 43.00

## 2021-08-18 ASSESSMENT — AXIALLENGTH_DERIVED
OS_AL: 24.8974
OD_AL: 24.3576

## 2021-08-18 ASSESSMENT — SPHEQUIV_DERIVED
OD_SPHEQUIV: -2.375
OS_SPHEQUIV: -4

## 2021-08-25 ENCOUNTER — DOCTOR'S OFFICE (OUTPATIENT)
Dept: URBAN - METROPOLITAN AREA CLINIC 125 | Facility: CLINIC | Age: 76
Setting detail: OPHTHALMOLOGY
End: 2021-08-25
Payer: COMMERCIAL

## 2021-08-25 DIAGNOSIS — E11.3412: ICD-10-CM

## 2021-08-25 PROCEDURE — 67028 INJECTION EYE DRUG: CPT | Performed by: OPHTHALMOLOGY

## 2021-09-01 ENCOUNTER — DOCTOR'S OFFICE (OUTPATIENT)
Dept: URBAN - METROPOLITAN AREA CLINIC 125 | Facility: CLINIC | Age: 76
Setting detail: OPHTHALMOLOGY
End: 2021-09-01
Payer: COMMERCIAL

## 2021-09-01 VITALS — HEIGHT: 55 IN

## 2021-09-01 DIAGNOSIS — H43.811: ICD-10-CM

## 2021-09-01 DIAGNOSIS — H04.122: ICD-10-CM

## 2021-09-01 DIAGNOSIS — H04.121: ICD-10-CM

## 2021-09-01 DIAGNOSIS — E11.3413: ICD-10-CM

## 2021-09-01 DIAGNOSIS — H43.812: ICD-10-CM

## 2021-09-01 DIAGNOSIS — H43.813: ICD-10-CM

## 2021-09-01 PROCEDURE — 92134 CPTRZ OPH DX IMG PST SGM RTA: CPT | Performed by: OPHTHALMOLOGY

## 2021-09-01 PROCEDURE — 92014 COMPRE OPH EXAM EST PT 1/>: CPT | Performed by: OPHTHALMOLOGY

## 2021-09-01 PROCEDURE — 92201 OPSCPY EXTND RTA DRAW UNI/BI: CPT | Performed by: OPHTHALMOLOGY

## 2021-09-01 ASSESSMENT — KERATOMETRY
OS_AXISANGLE_DEGREES: 103
OS_K2POWER_DIOPTERS: 45.00
OS_K1POWER_DIOPTERS: 43.75
OD_K1POWER_DIOPTERS: 43.00
OD_AXISANGLE_DEGREES: 106
OD_K2POWER_DIOPTERS: 45.00

## 2021-09-01 ASSESSMENT — SUPERFICIAL PUNCTATE KERATITIS (SPK)
OD_SPK: 1+
OS_SPK: 1+

## 2021-09-01 ASSESSMENT — VISUAL ACUITY
OS_BCVA: 20/30
OD_BCVA: 20/30-2

## 2021-09-01 ASSESSMENT — REFRACTION_AUTOREFRACTION
OS_AXIS: 094
OD_CYLINDER: -2.25
OS_CYLINDER: -2.00
OD_AXIS: 104
OD_SPHERE: -1.25
OS_SPHERE: -3.00

## 2021-09-01 ASSESSMENT — CONFRONTATIONAL VISUAL FIELD TEST (CVF)
OD_FINDINGS: FULL
OS_FINDINGS: FULL

## 2021-09-01 ASSESSMENT — SPHEQUIV_DERIVED
OD_SPHEQUIV: -2.375
OS_SPHEQUIV: -4

## 2021-09-01 ASSESSMENT — AXIALLENGTH_DERIVED
OS_AL: 24.8974
OD_AL: 24.3576

## 2021-09-15 ASSESSMENT — AXIALLENGTH_DERIVED
OD_AL: 24.3576
OS_AL: 24.8974

## 2021-09-15 ASSESSMENT — REFRACTION_AUTOREFRACTION
OD_CYLINDER: -2.25
OS_AXIS: 094
OD_SPHERE: -1.25
OS_SPHERE: -3.00
OS_CYLINDER: -2.00
OD_AXIS: 104

## 2021-09-15 ASSESSMENT — VISUAL ACUITY
OD_BCVA: 20/30-2
OS_BCVA: 20/40+1

## 2021-09-15 ASSESSMENT — KERATOMETRY
OD_AXISANGLE_DEGREES: 106
OD_K1POWER_DIOPTERS: 43.00
OS_K2POWER_DIOPTERS: 45.00
OS_AXISANGLE_DEGREES: 103
OD_K2POWER_DIOPTERS: 45.00
OS_K1POWER_DIOPTERS: 43.75

## 2021-09-15 ASSESSMENT — SPHEQUIV_DERIVED
OD_SPHEQUIV: -2.375
OS_SPHEQUIV: -4

## 2021-09-29 ENCOUNTER — RX ONLY (RX ONLY)
Age: 76
End: 2021-09-29

## 2021-09-29 ENCOUNTER — DOCTOR'S OFFICE (OUTPATIENT)
Dept: URBAN - METROPOLITAN AREA CLINIC 125 | Facility: CLINIC | Age: 76
Setting detail: OPHTHALMOLOGY
End: 2021-09-29
Payer: COMMERCIAL

## 2021-09-29 DIAGNOSIS — E11.3411: ICD-10-CM

## 2021-09-29 PROCEDURE — 67028 INJECTION EYE DRUG: CPT | Performed by: OPHTHALMOLOGY

## 2021-09-29 ASSESSMENT — REFRACTION_AUTOREFRACTION
OD_AXIS: 104
OS_CYLINDER: -2.00
OS_SPHERE: -3.00
OD_SPHERE: -1.25
OD_CYLINDER: -2.25
OS_AXIS: 094

## 2021-09-29 ASSESSMENT — KERATOMETRY
OD_K2POWER_DIOPTERS: 45.00
OS_K1POWER_DIOPTERS: 43.75
OS_K2POWER_DIOPTERS: 45.00
OD_AXISANGLE_DEGREES: 106
OS_AXISANGLE_DEGREES: 103
OD_K1POWER_DIOPTERS: 43.00

## 2021-09-29 ASSESSMENT — AXIALLENGTH_DERIVED
OD_AL: 24.3576
OS_AL: 24.8974

## 2021-09-29 ASSESSMENT — VISUAL ACUITY
OD_BCVA: 20/30-2
OS_BCVA: 20/30

## 2021-09-29 ASSESSMENT — SPHEQUIV_DERIVED
OS_SPHEQUIV: -4
OD_SPHEQUIV: -2.375

## 2021-10-06 ENCOUNTER — DOCTOR'S OFFICE (OUTPATIENT)
Dept: URBAN - METROPOLITAN AREA CLINIC 125 | Facility: CLINIC | Age: 76
Setting detail: OPHTHALMOLOGY
End: 2021-10-06
Payer: COMMERCIAL

## 2021-10-06 ENCOUNTER — RX ONLY (RX ONLY)
Age: 76
End: 2021-10-06

## 2021-10-06 DIAGNOSIS — E11.3412: ICD-10-CM

## 2021-10-06 PROCEDURE — 67028 INJECTION EYE DRUG: CPT | Performed by: OPHTHALMOLOGY

## 2021-10-06 ASSESSMENT — KERATOMETRY
OS_K1POWER_DIOPTERS: 43.75
OS_AXISANGLE_DEGREES: 103
OD_K2POWER_DIOPTERS: 45.00
OD_K1POWER_DIOPTERS: 43.00
OS_K2POWER_DIOPTERS: 45.00
OD_AXISANGLE_DEGREES: 106

## 2021-10-06 ASSESSMENT — REFRACTION_AUTOREFRACTION
OD_CYLINDER: -2.25
OS_SPHERE: -3.00
OS_AXIS: 094
OS_CYLINDER: -2.00
OD_SPHERE: -1.25
OD_AXIS: 104

## 2021-10-06 ASSESSMENT — VISUAL ACUITY
OS_BCVA: 20/30
OD_BCVA: 20/30-2

## 2021-10-06 ASSESSMENT — AXIALLENGTH_DERIVED
OD_AL: 24.3576
OS_AL: 24.8974

## 2021-10-06 ASSESSMENT — SPHEQUIV_DERIVED
OS_SPHEQUIV: -4
OD_SPHEQUIV: -2.375

## 2021-10-13 ENCOUNTER — DOCTOR'S OFFICE (OUTPATIENT)
Dept: URBAN - METROPOLITAN AREA CLINIC 125 | Facility: CLINIC | Age: 76
Setting detail: OPHTHALMOLOGY
End: 2021-10-13
Payer: COMMERCIAL

## 2021-10-13 DIAGNOSIS — H43.811: ICD-10-CM

## 2021-10-13 DIAGNOSIS — H04.121: ICD-10-CM

## 2021-10-13 DIAGNOSIS — Z79.84: ICD-10-CM

## 2021-10-13 DIAGNOSIS — E11.3413: ICD-10-CM

## 2021-10-13 DIAGNOSIS — H43.812: ICD-10-CM

## 2021-10-13 DIAGNOSIS — H04.122: ICD-10-CM

## 2021-10-13 PROCEDURE — 92201 OPSCPY EXTND RTA DRAW UNI/BI: CPT | Performed by: OPHTHALMOLOGY

## 2021-10-13 PROCEDURE — 92014 COMPRE OPH EXAM EST PT 1/>: CPT | Performed by: OPHTHALMOLOGY

## 2021-10-13 PROCEDURE — 92134 CPTRZ OPH DX IMG PST SGM RTA: CPT | Performed by: OPHTHALMOLOGY

## 2021-10-13 ASSESSMENT — VISUAL ACUITY
OD_BCVA: 20/30-2
OS_BCVA: 20/30-2

## 2021-10-13 ASSESSMENT — AXIALLENGTH_DERIVED
OD_AL: 24.3576
OS_AL: 24.8974

## 2021-10-13 ASSESSMENT — REFRACTION_AUTOREFRACTION
OS_CYLINDER: -2.00
OS_SPHERE: -3.00
OD_SPHERE: -1.25
OD_AXIS: 104
OS_AXIS: 094
OD_CYLINDER: -2.25

## 2021-10-13 ASSESSMENT — KERATOMETRY
OS_K1POWER_DIOPTERS: 43.75
OS_AXISANGLE_DEGREES: 103
OS_K2POWER_DIOPTERS: 45.00
OD_K1POWER_DIOPTERS: 43.00
OD_AXISANGLE_DEGREES: 106
OD_K2POWER_DIOPTERS: 45.00

## 2021-10-13 ASSESSMENT — CONFRONTATIONAL VISUAL FIELD TEST (CVF)
OD_FINDINGS: FULL
OS_FINDINGS: FULL

## 2021-10-13 ASSESSMENT — SPHEQUIV_DERIVED
OS_SPHEQUIV: -4
OD_SPHEQUIV: -2.375

## 2021-10-13 ASSESSMENT — SUPERFICIAL PUNCTATE KERATITIS (SPK)
OS_SPK: 1+
OD_SPK: 1+

## 2021-11-10 ENCOUNTER — DOCTOR'S OFFICE (OUTPATIENT)
Dept: URBAN - METROPOLITAN AREA CLINIC 125 | Facility: CLINIC | Age: 76
Setting detail: OPHTHALMOLOGY
End: 2021-11-10
Payer: COMMERCIAL

## 2021-11-10 VITALS — HEIGHT: 55 IN

## 2021-11-10 DIAGNOSIS — H43.812: ICD-10-CM

## 2021-11-10 DIAGNOSIS — E11.3412: ICD-10-CM

## 2021-11-10 DIAGNOSIS — H43.811: ICD-10-CM

## 2021-11-10 DIAGNOSIS — E11.3411: ICD-10-CM

## 2021-11-10 PROCEDURE — 67028 INJECTION EYE DRUG: CPT | Performed by: OPHTHALMOLOGY

## 2021-11-10 PROCEDURE — 92235 FLUORESCEIN ANGRPH MLTIFRAME: CPT | Performed by: OPHTHALMOLOGY

## 2021-11-10 PROCEDURE — 92250 FUNDUS PHOTOGRAPHY W/I&R: CPT | Performed by: OPHTHALMOLOGY

## 2021-11-10 ASSESSMENT — SPHEQUIV_DERIVED
OD_SPHEQUIV: -2.375
OS_SPHEQUIV: -4

## 2021-11-10 ASSESSMENT — AXIALLENGTH_DERIVED
OS_AL: 24.8974
OD_AL: 24.3576

## 2021-11-10 ASSESSMENT — KERATOMETRY
OS_K1POWER_DIOPTERS: 43.75
OS_AXISANGLE_DEGREES: 103
OD_K2POWER_DIOPTERS: 45.00
OD_K1POWER_DIOPTERS: 43.00
OD_AXISANGLE_DEGREES: 106
OS_K2POWER_DIOPTERS: 45.00

## 2021-11-10 ASSESSMENT — REFRACTION_AUTOREFRACTION
OD_SPHERE: -1.25
OS_SPHERE: -3.00
OD_CYLINDER: -2.25
OS_CYLINDER: -2.00
OS_AXIS: 094
OD_AXIS: 104

## 2021-11-10 ASSESSMENT — VISUAL ACUITY
OD_BCVA: 20/40-2
OS_BCVA: 20/25+1

## 2021-11-10 ASSESSMENT — CONFRONTATIONAL VISUAL FIELD TEST (CVF)
OD_FINDINGS: FULL
OS_FINDINGS: FULL

## 2021-11-12 ENCOUNTER — DOCTOR'S OFFICE (OUTPATIENT)
Dept: URBAN - METROPOLITAN AREA CLINIC 125 | Facility: CLINIC | Age: 76
Setting detail: OPHTHALMOLOGY
End: 2021-11-12
Payer: COMMERCIAL

## 2021-11-12 DIAGNOSIS — E11.3412: ICD-10-CM

## 2021-11-12 PROCEDURE — 67028 INJECTION EYE DRUG: CPT | Performed by: OPHTHALMOLOGY

## 2021-11-12 ASSESSMENT — KERATOMETRY
OS_K2POWER_DIOPTERS: 45.00
OD_AXISANGLE_DEGREES: 106
OD_K1POWER_DIOPTERS: 43.00
OD_K2POWER_DIOPTERS: 45.00
OS_K1POWER_DIOPTERS: 43.75
OS_AXISANGLE_DEGREES: 103

## 2021-11-12 ASSESSMENT — AXIALLENGTH_DERIVED
OS_AL: 24.8974
OD_AL: 24.3576

## 2021-11-12 ASSESSMENT — VISUAL ACUITY
OD_BCVA: 20/40-2
OS_BCVA: 20/25+1

## 2021-11-12 ASSESSMENT — REFRACTION_AUTOREFRACTION
OD_CYLINDER: -2.25
OS_AXIS: 094
OS_SPHERE: -3.00
OD_AXIS: 104
OS_CYLINDER: -2.00
OD_SPHERE: -1.25

## 2021-11-12 ASSESSMENT — SPHEQUIV_DERIVED
OS_SPHEQUIV: -4
OD_SPHEQUIV: -2.375

## 2021-11-24 ENCOUNTER — DOCTOR'S OFFICE (OUTPATIENT)
Dept: URBAN - METROPOLITAN AREA CLINIC 125 | Facility: CLINIC | Age: 76
Setting detail: OPHTHALMOLOGY
End: 2021-11-24
Payer: COMMERCIAL

## 2021-11-24 DIAGNOSIS — E11.3412: ICD-10-CM

## 2021-11-24 DIAGNOSIS — H04.121: ICD-10-CM

## 2021-11-24 DIAGNOSIS — E11.3411: ICD-10-CM

## 2021-11-24 DIAGNOSIS — H04.122: ICD-10-CM

## 2021-11-24 DIAGNOSIS — H43.812: ICD-10-CM

## 2021-11-24 DIAGNOSIS — H43.811: ICD-10-CM

## 2021-11-24 PROCEDURE — 92134 CPTRZ OPH DX IMG PST SGM RTA: CPT | Performed by: OPHTHALMOLOGY

## 2021-11-24 PROCEDURE — 99214 OFFICE O/P EST MOD 30 MIN: CPT | Performed by: OPHTHALMOLOGY

## 2021-11-24 ASSESSMENT — KERATOMETRY
OS_K1POWER_DIOPTERS: 43.75
OS_K2POWER_DIOPTERS: 45.00
OD_AXISANGLE_DEGREES: 106
OS_AXISANGLE_DEGREES: 103
OD_K1POWER_DIOPTERS: 43.00
OD_K2POWER_DIOPTERS: 45.00

## 2021-11-24 ASSESSMENT — CONFRONTATIONAL VISUAL FIELD TEST (CVF)
OD_FINDINGS: FULL
OS_FINDINGS: FULL

## 2021-11-24 ASSESSMENT — REFRACTION_AUTOREFRACTION
OD_SPHERE: -1.25
OD_AXIS: 104
OS_AXIS: 094
OS_SPHERE: -3.00
OD_CYLINDER: -2.25
OS_CYLINDER: -2.00

## 2021-11-24 ASSESSMENT — VISUAL ACUITY
OS_BCVA: 20/25+2
OD_BCVA: 20/40-3

## 2021-11-24 ASSESSMENT — AXIALLENGTH_DERIVED
OD_AL: 24.3576
OS_AL: 24.8974

## 2021-11-24 ASSESSMENT — SUPERFICIAL PUNCTATE KERATITIS (SPK)
OD_SPK: 1+
OS_SPK: 1+

## 2021-11-24 ASSESSMENT — SPHEQUIV_DERIVED
OS_SPHEQUIV: -4
OD_SPHEQUIV: -2.375

## 2021-12-08 ENCOUNTER — DOCTOR'S OFFICE (OUTPATIENT)
Dept: URBAN - METROPOLITAN AREA CLINIC 125 | Facility: CLINIC | Age: 76
Setting detail: OPHTHALMOLOGY
End: 2021-12-08
Payer: COMMERCIAL

## 2021-12-08 DIAGNOSIS — E11.3411: ICD-10-CM

## 2021-12-08 PROCEDURE — 67028 INJECTION EYE DRUG: CPT | Performed by: OPHTHALMOLOGY

## 2021-12-08 ASSESSMENT — REFRACTION_AUTOREFRACTION
OD_SPHERE: -1.25
OS_SPHERE: -3.00
OS_AXIS: 094
OD_CYLINDER: -2.25
OD_AXIS: 104
OS_CYLINDER: -2.00

## 2021-12-08 ASSESSMENT — KERATOMETRY
OS_K1POWER_DIOPTERS: 43.75
OD_K1POWER_DIOPTERS: 43.00
OS_AXISANGLE_DEGREES: 103
OS_K2POWER_DIOPTERS: 45.00
OD_AXISANGLE_DEGREES: 106
OD_K2POWER_DIOPTERS: 45.00

## 2021-12-08 ASSESSMENT — AXIALLENGTH_DERIVED
OD_AL: 24.3576
OS_AL: 24.8974

## 2021-12-08 ASSESSMENT — VISUAL ACUITY
OS_BCVA: 20/25+2
OD_BCVA: 20/40-3

## 2021-12-08 ASSESSMENT — SPHEQUIV_DERIVED
OS_SPHEQUIV: -4
OD_SPHEQUIV: -2.375

## 2021-12-10 ENCOUNTER — DOCTOR'S OFFICE (OUTPATIENT)
Dept: URBAN - METROPOLITAN AREA CLINIC 125 | Facility: CLINIC | Age: 76
Setting detail: OPHTHALMOLOGY
End: 2021-12-10
Payer: COMMERCIAL

## 2021-12-10 DIAGNOSIS — E11.3412: ICD-10-CM

## 2021-12-10 PROCEDURE — 67028 INJECTION EYE DRUG: CPT | Performed by: OPHTHALMOLOGY

## 2021-12-10 ASSESSMENT — KERATOMETRY
OD_K1POWER_DIOPTERS: 43.00
OD_K2POWER_DIOPTERS: 45.00
OS_K2POWER_DIOPTERS: 45.00
OD_AXISANGLE_DEGREES: 106
OS_AXISANGLE_DEGREES: 103
OS_K1POWER_DIOPTERS: 43.75

## 2021-12-10 ASSESSMENT — REFRACTION_AUTOREFRACTION
OS_SPHERE: -3.00
OS_AXIS: 094
OD_CYLINDER: -2.25
OS_CYLINDER: -2.00
OD_AXIS: 104
OD_SPHERE: -1.25

## 2021-12-10 ASSESSMENT — SPHEQUIV_DERIVED
OD_SPHEQUIV: -2.375
OS_SPHEQUIV: -4

## 2021-12-10 ASSESSMENT — VISUAL ACUITY
OD_BCVA: 20/40
OS_BCVA: 20/25+2

## 2021-12-10 ASSESSMENT — AXIALLENGTH_DERIVED
OS_AL: 24.8974
OD_AL: 24.3576

## 2021-12-22 ENCOUNTER — DOCTOR'S OFFICE (OUTPATIENT)
Dept: URBAN - METROPOLITAN AREA CLINIC 125 | Facility: CLINIC | Age: 76
Setting detail: OPHTHALMOLOGY
End: 2021-12-22
Payer: COMMERCIAL

## 2021-12-22 DIAGNOSIS — E11.3412: ICD-10-CM

## 2021-12-22 DIAGNOSIS — E11.3411: ICD-10-CM

## 2021-12-22 DIAGNOSIS — H04.121: ICD-10-CM

## 2021-12-22 DIAGNOSIS — H04.122: ICD-10-CM

## 2021-12-22 DIAGNOSIS — H43.812: ICD-10-CM

## 2021-12-22 DIAGNOSIS — H43.811: ICD-10-CM

## 2021-12-22 PROCEDURE — 99213 OFFICE O/P EST LOW 20 MIN: CPT | Performed by: OPHTHALMOLOGY

## 2021-12-22 PROCEDURE — 92134 CPTRZ OPH DX IMG PST SGM RTA: CPT | Performed by: OPHTHALMOLOGY

## 2021-12-22 ASSESSMENT — KERATOMETRY
OD_AXISANGLE_DEGREES: 106
OS_K1POWER_DIOPTERS: 43.75
OD_K2POWER_DIOPTERS: 45.00
OS_K2POWER_DIOPTERS: 45.00
OD_K1POWER_DIOPTERS: 43.00
OS_AXISANGLE_DEGREES: 103

## 2021-12-22 ASSESSMENT — REFRACTION_AUTOREFRACTION
OD_AXIS: 104
OS_CYLINDER: -2.00
OD_CYLINDER: -2.25
OD_SPHERE: -1.25
OS_SPHERE: -3.00
OS_AXIS: 094

## 2021-12-22 ASSESSMENT — VISUAL ACUITY
OS_BCVA: 20/25-2
OD_BCVA: 20/25+1

## 2021-12-22 ASSESSMENT — AXIALLENGTH_DERIVED
OS_AL: 24.8974
OD_AL: 24.3576

## 2021-12-22 ASSESSMENT — SUPERFICIAL PUNCTATE KERATITIS (SPK)
OS_SPK: 1+
OD_SPK: 1+

## 2021-12-22 ASSESSMENT — SPHEQUIV_DERIVED
OS_SPHEQUIV: -4
OD_SPHEQUIV: -2.375

## 2021-12-22 ASSESSMENT — CONFRONTATIONAL VISUAL FIELD TEST (CVF)
OD_FINDINGS: FULL
OS_FINDINGS: FULL

## 2022-01-14 ENCOUNTER — DOCTOR'S OFFICE (OUTPATIENT)
Dept: URBAN - METROPOLITAN AREA CLINIC 125 | Facility: CLINIC | Age: 77
Setting detail: OPHTHALMOLOGY
End: 2022-01-14
Payer: COMMERCIAL

## 2022-01-14 VITALS — HEIGHT: 55 IN

## 2022-01-14 DIAGNOSIS — E11.3411: ICD-10-CM

## 2022-01-14 PROCEDURE — 67028 INJECTION EYE DRUG: CPT | Performed by: OPHTHALMOLOGY

## 2022-01-14 ASSESSMENT — REFRACTION_AUTOREFRACTION
OD_CYLINDER: -2.25
OD_AXIS: 104
OS_SPHERE: -3.00
OD_SPHERE: -1.25
OS_AXIS: 094
OS_CYLINDER: -2.00

## 2022-01-14 ASSESSMENT — KERATOMETRY
OS_AXISANGLE_DEGREES: 103
OS_K2POWER_DIOPTERS: 45.00
OD_AXISANGLE_DEGREES: 106
OD_K2POWER_DIOPTERS: 45.00
OD_K1POWER_DIOPTERS: 43.00
OS_K1POWER_DIOPTERS: 43.75

## 2022-01-14 ASSESSMENT — SPHEQUIV_DERIVED
OS_SPHEQUIV: -4
OD_SPHEQUIV: -2.375

## 2022-01-14 ASSESSMENT — AXIALLENGTH_DERIVED
OS_AL: 24.8974
OD_AL: 24.3576

## 2022-01-14 ASSESSMENT — VISUAL ACUITY
OD_BCVA: 20/25+1
OS_BCVA: 20/25-2

## 2022-01-19 ENCOUNTER — DOCTOR'S OFFICE (OUTPATIENT)
Dept: URBAN - METROPOLITAN AREA CLINIC 125 | Facility: CLINIC | Age: 77
Setting detail: OPHTHALMOLOGY
End: 2022-01-19
Payer: COMMERCIAL

## 2022-01-19 DIAGNOSIS — E11.3412: ICD-10-CM

## 2022-01-19 PROCEDURE — 67028 INJECTION EYE DRUG: CPT | Performed by: OPHTHALMOLOGY

## 2022-01-19 ASSESSMENT — SPHEQUIV_DERIVED
OD_SPHEQUIV: -2.375
OS_SPHEQUIV: -4

## 2022-01-19 ASSESSMENT — KERATOMETRY
OS_K1POWER_DIOPTERS: 43.75
OD_AXISANGLE_DEGREES: 106
OD_K1POWER_DIOPTERS: 43.00
OS_K2POWER_DIOPTERS: 45.00
OS_AXISANGLE_DEGREES: 103
OD_K2POWER_DIOPTERS: 45.00

## 2022-01-19 ASSESSMENT — REFRACTION_AUTOREFRACTION
OS_SPHERE: -3.00
OD_CYLINDER: -2.25
OS_AXIS: 094
OD_SPHERE: -1.25
OD_AXIS: 104
OS_CYLINDER: -2.00

## 2022-01-19 ASSESSMENT — VISUAL ACUITY
OS_BCVA: 20/25-2
OD_BCVA: 20/25+1

## 2022-01-19 ASSESSMENT — AXIALLENGTH_DERIVED
OD_AL: 24.3576
OS_AL: 24.8974

## 2022-02-11 ENCOUNTER — DOCTOR'S OFFICE (OUTPATIENT)
Dept: URBAN - METROPOLITAN AREA CLINIC 125 | Facility: CLINIC | Age: 77
Setting detail: OPHTHALMOLOGY
End: 2022-02-11
Payer: COMMERCIAL

## 2022-02-11 DIAGNOSIS — H43.811: ICD-10-CM

## 2022-02-11 DIAGNOSIS — E11.3412: ICD-10-CM

## 2022-02-11 DIAGNOSIS — E11.3411: ICD-10-CM

## 2022-02-11 DIAGNOSIS — H43.812: ICD-10-CM

## 2022-02-11 PROCEDURE — 99213 OFFICE O/P EST LOW 20 MIN: CPT | Performed by: OPHTHALMOLOGY

## 2022-02-11 PROCEDURE — 92134 CPTRZ OPH DX IMG PST SGM RTA: CPT | Performed by: OPHTHALMOLOGY

## 2022-02-11 ASSESSMENT — KERATOMETRY
OD_K1POWER_DIOPTERS: 43.00
OD_K2POWER_DIOPTERS: 45.00
OS_K2POWER_DIOPTERS: 45.00
OS_K1POWER_DIOPTERS: 43.75
OS_AXISANGLE_DEGREES: 103
OD_AXISANGLE_DEGREES: 106

## 2022-02-11 ASSESSMENT — AXIALLENGTH_DERIVED
OD_AL: 24.3576
OS_AL: 24.8974

## 2022-02-11 ASSESSMENT — REFRACTION_AUTOREFRACTION
OS_SPHERE: -3.00
OD_CYLINDER: -2.25
OS_AXIS: 094
OS_CYLINDER: -2.00
OD_SPHERE: -1.25
OD_AXIS: 104

## 2022-02-11 ASSESSMENT — VISUAL ACUITY
OS_BCVA: 20/25
OD_BCVA: 20/25-2

## 2022-02-11 ASSESSMENT — SUPERFICIAL PUNCTATE KERATITIS (SPK)
OS_SPK: 1+
OD_SPK: 1+

## 2022-02-11 ASSESSMENT — SPHEQUIV_DERIVED
OD_SPHEQUIV: -2.375
OS_SPHEQUIV: -4

## 2022-02-11 ASSESSMENT — CONFRONTATIONAL VISUAL FIELD TEST (CVF)
OD_FINDINGS: FULL
OS_FINDINGS: FULL

## 2022-02-16 ENCOUNTER — DOCTOR'S OFFICE (OUTPATIENT)
Dept: URBAN - METROPOLITAN AREA CLINIC 125 | Facility: CLINIC | Age: 77
Setting detail: OPHTHALMOLOGY
End: 2022-02-16
Payer: COMMERCIAL

## 2022-02-16 VITALS — HEIGHT: 55 IN

## 2022-02-16 DIAGNOSIS — H43.811: ICD-10-CM

## 2022-02-16 DIAGNOSIS — E11.3411: ICD-10-CM

## 2022-02-16 DIAGNOSIS — E11.3412: ICD-10-CM

## 2022-02-16 DIAGNOSIS — H43.812: ICD-10-CM

## 2022-02-16 PROCEDURE — 99213 OFFICE O/P EST LOW 20 MIN: CPT | Performed by: OPHTHALMOLOGY

## 2022-02-16 PROCEDURE — 92235 FLUORESCEIN ANGRPH MLTIFRAME: CPT | Performed by: OPHTHALMOLOGY

## 2022-02-16 PROCEDURE — 92250 FUNDUS PHOTOGRAPHY W/I&R: CPT | Performed by: OPHTHALMOLOGY

## 2022-02-16 ASSESSMENT — VISUAL ACUITY
OS_BCVA: 20/30+2
OD_BCVA: 20/25-2

## 2022-02-16 ASSESSMENT — CONFRONTATIONAL VISUAL FIELD TEST (CVF)
OS_FINDINGS: FULL
OD_FINDINGS: FULL

## 2022-02-16 ASSESSMENT — KERATOMETRY
OS_AXISANGLE_DEGREES: 103
OS_K1POWER_DIOPTERS: 43.75
OD_K2POWER_DIOPTERS: 45.00
OD_K1POWER_DIOPTERS: 43.00
OD_AXISANGLE_DEGREES: 106
OS_K2POWER_DIOPTERS: 45.00

## 2022-02-16 ASSESSMENT — REFRACTION_AUTOREFRACTION
OS_CYLINDER: -2.00
OD_AXIS: 104
OD_SPHERE: -1.25
OS_AXIS: 094
OS_SPHERE: -3.00
OD_CYLINDER: -2.25

## 2022-02-16 ASSESSMENT — AXIALLENGTH_DERIVED
OD_AL: 24.3576
OS_AL: 24.8974

## 2022-02-16 ASSESSMENT — SUPERFICIAL PUNCTATE KERATITIS (SPK)
OS_SPK: 1+
OD_SPK: 1+

## 2022-02-16 ASSESSMENT — SPHEQUIV_DERIVED
OS_SPHEQUIV: -4
OD_SPHEQUIV: -2.375

## 2022-02-23 ENCOUNTER — DOCTOR'S OFFICE (OUTPATIENT)
Dept: URBAN - METROPOLITAN AREA CLINIC 125 | Facility: CLINIC | Age: 77
Setting detail: OPHTHALMOLOGY
End: 2022-02-23
Payer: COMMERCIAL

## 2022-02-23 DIAGNOSIS — E11.3411: ICD-10-CM

## 2022-02-23 PROCEDURE — 67028 INJECTION EYE DRUG: CPT | Performed by: OPHTHALMOLOGY

## 2022-02-24 ASSESSMENT — REFRACTION_AUTOREFRACTION
OD_AXIS: 104
OS_AXIS: 094
OS_SPHERE: -3.00
OD_CYLINDER: -2.25
OS_CYLINDER: -2.00
OD_SPHERE: -1.25

## 2022-02-24 ASSESSMENT — SPHEQUIV_DERIVED
OD_SPHEQUIV: -2.375
OS_SPHEQUIV: -4

## 2022-02-24 ASSESSMENT — KERATOMETRY
OS_K2POWER_DIOPTERS: 45.00
OS_K1POWER_DIOPTERS: 43.75
OD_K1POWER_DIOPTERS: 43.00
OD_K2POWER_DIOPTERS: 45.00
OD_AXISANGLE_DEGREES: 106
OS_AXISANGLE_DEGREES: 103

## 2022-02-24 ASSESSMENT — VISUAL ACUITY
OS_BCVA: 20/30+2
OD_BCVA: 20/25-2

## 2022-02-24 ASSESSMENT — AXIALLENGTH_DERIVED
OD_AL: 24.3576
OS_AL: 24.8974

## 2022-03-16 ENCOUNTER — DOCTOR'S OFFICE (OUTPATIENT)
Dept: URBAN - METROPOLITAN AREA CLINIC 125 | Facility: CLINIC | Age: 77
Setting detail: OPHTHALMOLOGY
End: 2022-03-16
Payer: COMMERCIAL

## 2022-03-16 DIAGNOSIS — H43.811: ICD-10-CM

## 2022-03-16 DIAGNOSIS — E11.3411: ICD-10-CM

## 2022-03-16 DIAGNOSIS — E11.3412: ICD-10-CM

## 2022-03-16 DIAGNOSIS — Z79.84: ICD-10-CM

## 2022-03-16 DIAGNOSIS — H43.812: ICD-10-CM

## 2022-03-16 PROCEDURE — 92134 CPTRZ OPH DX IMG PST SGM RTA: CPT | Performed by: OPHTHALMOLOGY

## 2022-03-16 PROCEDURE — 99213 OFFICE O/P EST LOW 20 MIN: CPT | Performed by: OPHTHALMOLOGY

## 2022-03-16 ASSESSMENT — SPHEQUIV_DERIVED
OS_SPHEQUIV: -4
OD_SPHEQUIV: -2.375

## 2022-03-16 ASSESSMENT — KERATOMETRY
OS_AXISANGLE_DEGREES: 103
OS_K2POWER_DIOPTERS: 45.00
OD_K1POWER_DIOPTERS: 43.00
OS_K1POWER_DIOPTERS: 43.75
OD_AXISANGLE_DEGREES: 106
OD_K2POWER_DIOPTERS: 45.00

## 2022-03-16 ASSESSMENT — REFRACTION_AUTOREFRACTION
OD_CYLINDER: -2.25
OS_SPHERE: -3.00
OD_SPHERE: -1.25
OD_AXIS: 104
OS_CYLINDER: -2.00
OS_AXIS: 094

## 2022-03-16 ASSESSMENT — VISUAL ACUITY
OD_BCVA: 20/30-2
OS_BCVA: 20/50-2

## 2022-03-16 ASSESSMENT — AXIALLENGTH_DERIVED
OD_AL: 24.3576
OS_AL: 24.8974

## 2022-03-16 ASSESSMENT — SUPERFICIAL PUNCTATE KERATITIS (SPK)
OS_SPK: 1+
OD_SPK: 1+

## 2022-04-06 ENCOUNTER — DOCTOR'S OFFICE (OUTPATIENT)
Dept: URBAN - METROPOLITAN AREA CLINIC 125 | Facility: CLINIC | Age: 77
Setting detail: OPHTHALMOLOGY
End: 2022-04-06
Payer: COMMERCIAL

## 2022-04-06 VITALS — HEIGHT: 55 IN

## 2022-04-06 DIAGNOSIS — E11.3411: ICD-10-CM

## 2022-04-06 PROCEDURE — 67028 INJECTION EYE DRUG: CPT | Performed by: OPHTHALMOLOGY

## 2022-04-06 ASSESSMENT — REFRACTION_AUTOREFRACTION
OS_CYLINDER: -2.00
OS_AXIS: 094
OD_SPHERE: -1.25
OS_SPHERE: -3.00
OD_AXIS: 104
OD_CYLINDER: -2.25

## 2022-04-06 ASSESSMENT — KERATOMETRY
OS_K1POWER_DIOPTERS: 43.75
OD_K2POWER_DIOPTERS: 45.00
OD_AXISANGLE_DEGREES: 106
OD_K1POWER_DIOPTERS: 43.00
OS_K2POWER_DIOPTERS: 45.00
OS_AXISANGLE_DEGREES: 103

## 2022-04-06 ASSESSMENT — SPHEQUIV_DERIVED
OD_SPHEQUIV: -2.375
OS_SPHEQUIV: -4

## 2022-04-06 ASSESSMENT — AXIALLENGTH_DERIVED
OD_AL: 24.3576
OS_AL: 24.8974

## 2022-04-06 ASSESSMENT — VISUAL ACUITY
OD_BCVA: 20/30-2
OS_BCVA: 20/40-1

## 2022-04-13 ENCOUNTER — DOCTOR'S OFFICE (OUTPATIENT)
Dept: URBAN - METROPOLITAN AREA CLINIC 125 | Facility: CLINIC | Age: 77
Setting detail: OPHTHALMOLOGY
End: 2022-04-13
Payer: COMMERCIAL

## 2022-04-13 DIAGNOSIS — E11.3412: ICD-10-CM

## 2022-04-13 PROCEDURE — 67028 INJECTION EYE DRUG: CPT | Performed by: OPHTHALMOLOGY

## 2022-04-13 ASSESSMENT — SPHEQUIV_DERIVED
OD_SPHEQUIV: -2.375
OS_SPHEQUIV: -4

## 2022-04-13 ASSESSMENT — KERATOMETRY
OS_AXISANGLE_DEGREES: 103
OD_AXISANGLE_DEGREES: 106
OD_K2POWER_DIOPTERS: 45.00
OD_K1POWER_DIOPTERS: 43.00
OS_K1POWER_DIOPTERS: 43.75
OS_K2POWER_DIOPTERS: 45.00

## 2022-04-13 ASSESSMENT — AXIALLENGTH_DERIVED
OS_AL: 24.8974
OD_AL: 24.3576

## 2022-04-13 ASSESSMENT — REFRACTION_AUTOREFRACTION
OD_AXIS: 104
OS_SPHERE: -3.00
OD_CYLINDER: -2.25
OS_CYLINDER: -2.00
OS_AXIS: 094
OD_SPHERE: -1.25

## 2022-04-13 ASSESSMENT — VISUAL ACUITY
OS_BCVA: 20/40-1
OD_BCVA: 20/30-2

## 2022-04-20 ENCOUNTER — DOCTOR'S OFFICE (OUTPATIENT)
Dept: URBAN - METROPOLITAN AREA CLINIC 125 | Facility: CLINIC | Age: 77
Setting detail: OPHTHALMOLOGY
End: 2022-04-20
Payer: COMMERCIAL

## 2022-04-20 VITALS — HEIGHT: 55 IN

## 2022-04-20 DIAGNOSIS — E11.3413: ICD-10-CM

## 2022-04-20 DIAGNOSIS — H43.812: ICD-10-CM

## 2022-04-20 DIAGNOSIS — H43.811: ICD-10-CM

## 2022-04-20 PROCEDURE — 92134 CPTRZ OPH DX IMG PST SGM RTA: CPT | Performed by: OPHTHALMOLOGY

## 2022-04-20 PROCEDURE — 92201 OPSCPY EXTND RTA DRAW UNI/BI: CPT | Performed by: OPHTHALMOLOGY

## 2022-04-20 PROCEDURE — 99213 OFFICE O/P EST LOW 20 MIN: CPT | Performed by: OPHTHALMOLOGY

## 2022-04-20 ASSESSMENT — SUPERFICIAL PUNCTATE KERATITIS (SPK)
OS_SPK: 1+
OD_SPK: 1+

## 2022-04-20 ASSESSMENT — KERATOMETRY
OD_K2POWER_DIOPTERS: 45.00
OD_AXISANGLE_DEGREES: 106
OS_AXISANGLE_DEGREES: 103
OS_K1POWER_DIOPTERS: 43.75
OD_K1POWER_DIOPTERS: 43.00
OS_K2POWER_DIOPTERS: 45.00

## 2022-04-20 ASSESSMENT — REFRACTION_AUTOREFRACTION
OS_AXIS: 094
OS_SPHERE: -3.00
OD_CYLINDER: -2.25
OS_CYLINDER: -2.00
OD_AXIS: 104
OD_SPHERE: -1.25

## 2022-04-20 ASSESSMENT — SPHEQUIV_DERIVED
OD_SPHEQUIV: -2.375
OS_SPHEQUIV: -4

## 2022-04-20 ASSESSMENT — VISUAL ACUITY
OD_BCVA: 20/50
OS_BCVA: 20/40-1

## 2022-04-20 ASSESSMENT — CONFRONTATIONAL VISUAL FIELD TEST (CVF)
OD_FINDINGS: FULL
OS_FINDINGS: FULL

## 2022-04-20 ASSESSMENT — AXIALLENGTH_DERIVED
OD_AL: 24.3576
OS_AL: 24.8974

## 2022-05-18 ENCOUNTER — DOCTOR'S OFFICE (OUTPATIENT)
Dept: URBAN - METROPOLITAN AREA CLINIC 125 | Facility: CLINIC | Age: 77
Setting detail: OPHTHALMOLOGY
End: 2022-05-18
Payer: COMMERCIAL

## 2022-05-18 VITALS — HEIGHT: 55 IN

## 2022-05-18 DIAGNOSIS — E11.3413: ICD-10-CM

## 2022-05-18 PROCEDURE — 92250 FUNDUS PHOTOGRAPHY W/I&R: CPT | Performed by: OPHTHALMOLOGY

## 2022-05-18 PROCEDURE — 92235 FLUORESCEIN ANGRPH MLTIFRAME: CPT | Performed by: OPHTHALMOLOGY

## 2022-05-18 PROCEDURE — 99213 OFFICE O/P EST LOW 20 MIN: CPT | Performed by: OPHTHALMOLOGY

## 2022-05-18 ASSESSMENT — KERATOMETRY
OD_K2POWER_DIOPTERS: 45.00
OS_K2POWER_DIOPTERS: 45.00
OD_K1POWER_DIOPTERS: 43.00
OS_K1POWER_DIOPTERS: 43.75
OS_AXISANGLE_DEGREES: 103
OD_AXISANGLE_DEGREES: 106

## 2022-05-18 ASSESSMENT — SUPERFICIAL PUNCTATE KERATITIS (SPK)
OS_SPK: 1+
OD_SPK: 1+

## 2022-05-18 ASSESSMENT — CONFRONTATIONAL VISUAL FIELD TEST (CVF)
OD_FINDINGS: FULL
OS_FINDINGS: FULL

## 2022-05-18 ASSESSMENT — REFRACTION_AUTOREFRACTION
OS_AXIS: 094
OD_CYLINDER: -2.25
OS_SPHERE: -3.00
OD_AXIS: 104
OD_SPHERE: -1.25
OS_CYLINDER: -2.00

## 2022-05-18 ASSESSMENT — SPHEQUIV_DERIVED
OD_SPHEQUIV: -2.375
OS_SPHEQUIV: -4

## 2022-05-18 ASSESSMENT — AXIALLENGTH_DERIVED
OD_AL: 24.3576
OS_AL: 24.8974

## 2022-05-18 ASSESSMENT — VISUAL ACUITY
OD_BCVA: 20/50-1
OS_BCVA: 20/60

## 2022-05-25 ENCOUNTER — DOCTOR'S OFFICE (OUTPATIENT)
Dept: URBAN - METROPOLITAN AREA CLINIC 125 | Facility: CLINIC | Age: 77
Setting detail: OPHTHALMOLOGY
End: 2022-05-25
Payer: COMMERCIAL

## 2022-05-25 DIAGNOSIS — E11.3411: ICD-10-CM

## 2022-05-25 PROCEDURE — 67028 INJECTION EYE DRUG: CPT | Performed by: OPHTHALMOLOGY

## 2022-05-25 ASSESSMENT — AXIALLENGTH_DERIVED
OS_AL: 24.8974
OD_AL: 24.3576

## 2022-05-25 ASSESSMENT — REFRACTION_AUTOREFRACTION
OD_CYLINDER: -2.25
OS_CYLINDER: -2.00
OS_AXIS: 094
OD_AXIS: 104
OD_SPHERE: -1.25
OS_SPHERE: -3.00

## 2022-05-25 ASSESSMENT — SPHEQUIV_DERIVED
OS_SPHEQUIV: -4
OD_SPHEQUIV: -2.375

## 2022-05-25 ASSESSMENT — VISUAL ACUITY
OS_BCVA: 20/40+2
OD_BCVA: 20/50-1

## 2022-05-27 ENCOUNTER — DOCTOR'S OFFICE (OUTPATIENT)
Dept: URBAN - METROPOLITAN AREA CLINIC 125 | Facility: CLINIC | Age: 77
Setting detail: OPHTHALMOLOGY
End: 2022-05-27
Payer: COMMERCIAL

## 2022-05-27 VITALS — HEIGHT: 55 IN

## 2022-05-27 DIAGNOSIS — E11.3412: ICD-10-CM

## 2022-05-27 PROCEDURE — 67028 INJECTION EYE DRUG: CPT | Performed by: OPHTHALMOLOGY

## 2022-05-27 ASSESSMENT — KERATOMETRY
OD_K2POWER_DIOPTERS: 45.00
OD_K1POWER_DIOPTERS: 43.00
OS_K2POWER_DIOPTERS: 45.00
OS_K1POWER_DIOPTERS: 43.75
OD_AXISANGLE_DEGREES: 106
OS_AXISANGLE_DEGREES: 103

## 2022-05-27 ASSESSMENT — REFRACTION_AUTOREFRACTION
OS_SPHERE: -3.00
OD_SPHERE: -1.25
OS_CYLINDER: -2.00
OS_AXIS: 094
OD_CYLINDER: -2.25
OD_AXIS: 104

## 2022-05-27 ASSESSMENT — VISUAL ACUITY
OD_BCVA: 20/50+2
OS_BCVA: 20/40+2

## 2022-05-27 ASSESSMENT — SPHEQUIV_DERIVED
OS_SPHEQUIV: -4
OD_SPHEQUIV: -2.375

## 2022-05-27 ASSESSMENT — AXIALLENGTH_DERIVED
OS_AL: 24.8974
OD_AL: 24.3576

## 2022-06-03 ENCOUNTER — DOCTOR'S OFFICE (OUTPATIENT)
Dept: URBAN - NONMETROPOLITAN AREA CLINIC 1 | Facility: CLINIC | Age: 77
Setting detail: OPHTHALMOLOGY
End: 2022-06-03
Payer: COMMERCIAL

## 2022-06-03 DIAGNOSIS — H33.041: ICD-10-CM

## 2022-06-03 DIAGNOSIS — H43.11: ICD-10-CM

## 2022-06-03 PROCEDURE — 92202 OPSCPY EXTND ON/MAC DRAW: CPT | Performed by: OPHTHALMOLOGY

## 2022-06-03 PROCEDURE — 99214 OFFICE O/P EST MOD 30 MIN: CPT | Performed by: OPHTHALMOLOGY

## 2022-06-03 ASSESSMENT — VISUAL ACUITY
OD_BCVA: 20/30-2
OS_BCVA: 20/HM

## 2022-06-03 ASSESSMENT — REFRACTION_AUTOREFRACTION
OS_SPHERE: -3.00
OS_AXIS: 094
OD_SPHERE: -1.25
OD_AXIS: 104
OD_CYLINDER: -2.25
OS_CYLINDER: -2.00

## 2022-06-03 ASSESSMENT — SUPERFICIAL PUNCTATE KERATITIS (SPK)
OS_SPK: 1+
OD_SPK: 1+

## 2022-06-03 ASSESSMENT — KERATOMETRY
OD_AXISANGLE_DEGREES: 106
OD_K1POWER_DIOPTERS: 43.00
OS_K1POWER_DIOPTERS: 43.75
OD_K2POWER_DIOPTERS: 45.00
OS_K2POWER_DIOPTERS: 45.00
OS_AXISANGLE_DEGREES: 103

## 2022-06-03 ASSESSMENT — AXIALLENGTH_DERIVED
OS_AL: 24.8974
OD_AL: 24.3576

## 2022-06-03 ASSESSMENT — SPHEQUIV_DERIVED
OD_SPHEQUIV: -2.375
OS_SPHEQUIV: -4

## 2022-06-03 ASSESSMENT — CONFRONTATIONAL VISUAL FIELD TEST (CVF)
OD_FINDINGS: FULL
OS_FINDINGS: FULL

## 2022-06-08 ENCOUNTER — AMBUL SURGICAL CARE (OUTPATIENT)
Dept: URBAN - METROPOLITAN AREA SURGERY 29 | Facility: SURGERY | Age: 77
Setting detail: OPHTHALMOLOGY
End: 2022-06-08
Payer: COMMERCIAL

## 2022-06-08 DIAGNOSIS — H33.021: ICD-10-CM

## 2022-06-08 PROCEDURE — G8907 PT DOC NO EVENTS ON DISCHARG: HCPCS | Performed by: CLINIC/CENTER

## 2022-06-08 PROCEDURE — 67113 REPAIR RETINAL DETACH CPLX: CPT | Performed by: OPHTHALMOLOGY

## 2022-06-08 PROCEDURE — G8918 PT W/O PREOP ORDER IV AB PRO: HCPCS | Performed by: OPHTHALMOLOGY

## 2022-06-08 PROCEDURE — G8907 PT DOC NO EVENTS ON DISCHARG: HCPCS | Performed by: OPHTHALMOLOGY

## 2022-06-08 PROCEDURE — G8918 PT W/O PREOP ORDER IV AB PRO: HCPCS | Performed by: CLINIC/CENTER

## 2022-06-08 PROCEDURE — 67113 REPAIR RETINAL DETACH CPLX: CPT | Performed by: CLINIC/CENTER

## 2022-06-09 ENCOUNTER — RX ONLY (RX ONLY)
Age: 77
End: 2022-06-09

## 2022-06-09 ENCOUNTER — DOCTOR'S OFFICE (OUTPATIENT)
Dept: URBAN - NONMETROPOLITAN AREA CLINIC 1 | Facility: CLINIC | Age: 77
Setting detail: OPHTHALMOLOGY
End: 2022-06-09

## 2022-06-09 DIAGNOSIS — H33.021: ICD-10-CM

## 2022-06-09 PROCEDURE — 99024 POSTOP FOLLOW-UP VISIT: CPT | Performed by: OPHTHALMOLOGY

## 2022-06-09 ASSESSMENT — REFRACTION_AUTOREFRACTION
OD_SPHERE: -1.25
OS_CYLINDER: -2.00
OD_CYLINDER: -2.25
OD_AXIS: 104
OS_AXIS: 094
OS_SPHERE: -3.00

## 2022-06-09 ASSESSMENT — SPHEQUIV_DERIVED
OS_SPHEQUIV: -4
OD_SPHEQUIV: -2.375

## 2022-06-09 ASSESSMENT — VISUAL ACUITY
OS_BCVA: 20/CF 6FT
OD_BCVA: 20/400+1

## 2022-06-09 ASSESSMENT — AXIALLENGTH_DERIVED
OD_AL: 24.3576
OS_AL: 24.8974

## 2022-06-15 ENCOUNTER — DOCTOR'S OFFICE (OUTPATIENT)
Dept: URBAN - METROPOLITAN AREA CLINIC 125 | Facility: CLINIC | Age: 77
Setting detail: OPHTHALMOLOGY
End: 2022-06-15
Payer: COMMERCIAL

## 2022-06-15 ENCOUNTER — RX ONLY (RX ONLY)
Age: 77
End: 2022-06-15

## 2022-06-15 VITALS — HEIGHT: 55 IN

## 2022-06-15 DIAGNOSIS — Z79.84: ICD-10-CM

## 2022-06-15 DIAGNOSIS — H43.11: ICD-10-CM

## 2022-06-15 DIAGNOSIS — H43.813: ICD-10-CM

## 2022-06-15 DIAGNOSIS — H33.021: ICD-10-CM

## 2022-06-15 DIAGNOSIS — H35.81: ICD-10-CM

## 2022-06-15 DIAGNOSIS — E11.3411: ICD-10-CM

## 2022-06-15 DIAGNOSIS — E11.3412: ICD-10-CM

## 2022-06-15 PROCEDURE — 92134 CPTRZ OPH DX IMG PST SGM RTA: CPT | Performed by: OPHTHALMOLOGY

## 2022-06-15 PROCEDURE — 99024 POSTOP FOLLOW-UP VISIT: CPT | Performed by: OPHTHALMOLOGY

## 2022-06-15 ASSESSMENT — KERATOMETRY
OD_AXISANGLE_DEGREES: 106
OD_K1POWER_DIOPTERS: 43.00
OS_K2POWER_DIOPTERS: 45.00
OS_AXISANGLE_DEGREES: 103
OS_K1POWER_DIOPTERS: 43.75
OD_K2POWER_DIOPTERS: 45.00

## 2022-06-15 ASSESSMENT — AXIALLENGTH_DERIVED
OD_AL: 24.3576
OS_AL: 24.8974

## 2022-06-15 ASSESSMENT — REFRACTION_AUTOREFRACTION
OS_AXIS: 094
OD_SPHERE: -1.25
OS_SPHERE: -3.00
OS_CYLINDER: -2.00
OD_CYLINDER: -2.25
OD_AXIS: 104

## 2022-06-15 ASSESSMENT — VISUAL ACUITY
OD_BCVA: 20/40-2
OS_BCVA: 20/400

## 2022-06-15 ASSESSMENT — CONFRONTATIONAL VISUAL FIELD TEST (CVF)
OS_FINDINGS: FULL
OD_FINDINGS: FULL

## 2022-06-15 ASSESSMENT — SPHEQUIV_DERIVED
OS_SPHEQUIV: -4
OD_SPHEQUIV: -2.375

## 2022-06-15 ASSESSMENT — SUPERFICIAL PUNCTATE KERATITIS (SPK)
OS_SPK: 1+
OD_SPK: 1+

## 2022-06-22 ENCOUNTER — DOCTOR'S OFFICE (OUTPATIENT)
Dept: URBAN - METROPOLITAN AREA CLINIC 125 | Facility: CLINIC | Age: 77
Setting detail: OPHTHALMOLOGY
End: 2022-06-22
Payer: COMMERCIAL

## 2022-06-22 DIAGNOSIS — H43.11: ICD-10-CM

## 2022-06-22 DIAGNOSIS — E11.3411: ICD-10-CM

## 2022-06-22 DIAGNOSIS — H33.021: ICD-10-CM

## 2022-06-22 DIAGNOSIS — E11.3412: ICD-10-CM

## 2022-06-22 PROCEDURE — 99024 POSTOP FOLLOW-UP VISIT: CPT | Performed by: OPHTHALMOLOGY

## 2022-06-22 PROCEDURE — 67028 INJECTION EYE DRUG: CPT | Performed by: OPHTHALMOLOGY

## 2022-06-22 PROCEDURE — 92134 CPTRZ OPH DX IMG PST SGM RTA: CPT | Performed by: OPHTHALMOLOGY

## 2022-06-22 ASSESSMENT — REFRACTION_AUTOREFRACTION
OD_AXIS: 104
OS_AXIS: 094
OS_SPHERE: -3.00
OD_SPHERE: -1.25
OS_CYLINDER: -2.00
OD_CYLINDER: -2.25

## 2022-06-22 ASSESSMENT — VISUAL ACUITY
OS_BCVA: 20/400
OD_BCVA: 20/40-2

## 2022-06-22 ASSESSMENT — AXIALLENGTH_DERIVED
OD_AL: 24.3576
OS_AL: 24.8974

## 2022-06-22 ASSESSMENT — SUPERFICIAL PUNCTATE KERATITIS (SPK)
OD_SPK: 1+
OS_SPK: 1+

## 2022-06-22 ASSESSMENT — SPHEQUIV_DERIVED
OD_SPHEQUIV: -2.375
OS_SPHEQUIV: -4

## 2022-06-22 ASSESSMENT — CONFRONTATIONAL VISUAL FIELD TEST (CVF)
OD_FINDINGS: FULL
OS_FINDINGS: FULL

## 2022-06-29 ENCOUNTER — DOCTOR'S OFFICE (OUTPATIENT)
Dept: URBAN - METROPOLITAN AREA CLINIC 125 | Facility: CLINIC | Age: 77
Setting detail: OPHTHALMOLOGY
End: 2022-06-29
Payer: COMMERCIAL

## 2022-06-29 VITALS — HEIGHT: 55 IN

## 2022-06-29 DIAGNOSIS — E11.3412: ICD-10-CM

## 2022-06-29 DIAGNOSIS — E11.3411: ICD-10-CM

## 2022-06-29 DIAGNOSIS — H43.11: ICD-10-CM

## 2022-06-29 PROCEDURE — 67028 INJECTION EYE DRUG: CPT | Performed by: OPHTHALMOLOGY

## 2022-06-29 PROCEDURE — 99024 POSTOP FOLLOW-UP VISIT: CPT | Performed by: OPHTHALMOLOGY

## 2022-06-29 ASSESSMENT — KERATOMETRY
OS_K2POWER_DIOPTERS: 45.00
OS_K1POWER_DIOPTERS: 43.75
OS_AXISANGLE_DEGREES: 103
OD_K2POWER_DIOPTERS: 45.00
OD_AXISANGLE_DEGREES: 106
OD_K1POWER_DIOPTERS: 43.00

## 2022-06-29 ASSESSMENT — SPHEQUIV_DERIVED
OS_SPHEQUIV: -4
OD_SPHEQUIV: -2.375

## 2022-06-29 ASSESSMENT — AXIALLENGTH_DERIVED
OD_AL: 24.3576
OS_AL: 24.8974

## 2022-06-29 ASSESSMENT — REFRACTION_AUTOREFRACTION
OS_AXIS: 094
OS_SPHERE: -3.00
OD_SPHERE: -1.25
OD_CYLINDER: -2.25
OD_AXIS: 104
OS_CYLINDER: -2.00

## 2022-06-29 ASSESSMENT — VISUAL ACUITY
OD_BCVA: 20/30-1
OS_BCVA: 20/400

## 2022-07-22 ENCOUNTER — RX ONLY (RX ONLY)
Age: 77
End: 2022-07-22

## 2022-07-22 ENCOUNTER — DOCTOR'S OFFICE (OUTPATIENT)
Dept: URBAN - METROPOLITAN AREA CLINIC 125 | Facility: CLINIC | Age: 77
Setting detail: OPHTHALMOLOGY
End: 2022-07-22
Payer: COMMERCIAL

## 2022-07-22 VITALS — HEIGHT: 55 IN

## 2022-07-22 DIAGNOSIS — H33.021: ICD-10-CM

## 2022-07-22 DIAGNOSIS — H43.11: ICD-10-CM

## 2022-07-22 DIAGNOSIS — E11.3412: ICD-10-CM

## 2022-07-22 DIAGNOSIS — H43.811: ICD-10-CM

## 2022-07-22 DIAGNOSIS — H43.812: ICD-10-CM

## 2022-07-22 DIAGNOSIS — Z79.84: ICD-10-CM

## 2022-07-22 DIAGNOSIS — E11.3411: ICD-10-CM

## 2022-07-22 PROCEDURE — 99024 POSTOP FOLLOW-UP VISIT: CPT | Performed by: OPHTHALMOLOGY

## 2022-07-22 PROCEDURE — 92134 CPTRZ OPH DX IMG PST SGM RTA: CPT | Performed by: OPHTHALMOLOGY

## 2022-07-22 ASSESSMENT — SPHEQUIV_DERIVED
OD_SPHEQUIV: -2.375
OS_SPHEQUIV: -4

## 2022-07-22 ASSESSMENT — REFRACTION_AUTOREFRACTION
OS_AXIS: 094
OD_AXIS: 104
OS_CYLINDER: -2.00
OD_SPHERE: -1.25
OD_CYLINDER: -2.25
OS_SPHERE: -3.00

## 2022-07-22 ASSESSMENT — SUPERFICIAL PUNCTATE KERATITIS (SPK)
OD_SPK: 1+
OS_SPK: 1+

## 2022-07-22 ASSESSMENT — AXIALLENGTH_DERIVED
OS_AL: 24.8974
OD_AL: 24.3576

## 2022-07-22 ASSESSMENT — KERATOMETRY
OD_K2POWER_DIOPTERS: 45.00
OS_K1POWER_DIOPTERS: 43.75
OS_K2POWER_DIOPTERS: 45.00
OS_AXISANGLE_DEGREES: 103
OD_K1POWER_DIOPTERS: 43.00
OD_AXISANGLE_DEGREES: 106

## 2022-07-22 ASSESSMENT — CONFRONTATIONAL VISUAL FIELD TEST (CVF)
OS_FINDINGS: FULL
OD_FINDINGS: FULL

## 2022-07-22 ASSESSMENT — VISUAL ACUITY: OD_BCVA: 20/40+2

## 2022-08-03 ENCOUNTER — RX ONLY (RX ONLY)
Age: 77
End: 2022-08-03

## 2022-08-03 ENCOUNTER — DOCTOR'S OFFICE (OUTPATIENT)
Dept: URBAN - METROPOLITAN AREA CLINIC 125 | Facility: CLINIC | Age: 77
Setting detail: OPHTHALMOLOGY
End: 2022-08-03
Payer: COMMERCIAL

## 2022-08-03 VITALS — HEIGHT: 55 IN

## 2022-08-03 DIAGNOSIS — E11.3411: ICD-10-CM

## 2022-08-03 DIAGNOSIS — E11.3412: ICD-10-CM

## 2022-08-03 DIAGNOSIS — H43.11: ICD-10-CM

## 2022-08-03 PROCEDURE — 67028 INJECTION EYE DRUG: CPT | Performed by: OPHTHALMOLOGY

## 2022-08-03 PROCEDURE — 99024 POSTOP FOLLOW-UP VISIT: CPT | Performed by: OPHTHALMOLOGY

## 2022-08-03 ASSESSMENT — KERATOMETRY
OD_K2POWER_DIOPTERS: 45.00
OD_K1POWER_DIOPTERS: 43.00
OS_AXISANGLE_DEGREES: 103
OS_K2POWER_DIOPTERS: 45.00
OD_AXISANGLE_DEGREES: 106
OS_K1POWER_DIOPTERS: 43.75

## 2022-08-03 ASSESSMENT — REFRACTION_AUTOREFRACTION
OS_CYLINDER: -2.00
OS_SPHERE: -3.00
OS_AXIS: 094
OD_SPHERE: -1.25
OD_AXIS: 104
OD_CYLINDER: -2.25

## 2022-08-03 ASSESSMENT — SPHEQUIV_DERIVED
OD_SPHEQUIV: -2.375
OS_SPHEQUIV: -4

## 2022-08-03 ASSESSMENT — VISUAL ACUITY
OS_BCVA: CF 1FT
OD_BCVA: 20/40+2

## 2022-08-03 ASSESSMENT — AXIALLENGTH_DERIVED
OD_AL: 24.3576
OS_AL: 24.8974

## 2022-08-10 ENCOUNTER — DOCTOR'S OFFICE (OUTPATIENT)
Dept: URBAN - METROPOLITAN AREA CLINIC 125 | Facility: CLINIC | Age: 77
Setting detail: OPHTHALMOLOGY
End: 2022-08-10
Payer: COMMERCIAL

## 2022-08-10 ENCOUNTER — RX ONLY (RX ONLY)
Age: 77
End: 2022-08-10

## 2022-08-10 DIAGNOSIS — E11.3412: ICD-10-CM

## 2022-08-10 PROCEDURE — 67028 INJECTION EYE DRUG: CPT | Performed by: OPHTHALMOLOGY

## 2022-08-10 ASSESSMENT — AXIALLENGTH_DERIVED
OS_AL: 24.8974
OD_AL: 24.3576

## 2022-08-10 ASSESSMENT — KERATOMETRY
OD_K1POWER_DIOPTERS: 43.00
OS_AXISANGLE_DEGREES: 103
OD_AXISANGLE_DEGREES: 106
OS_K1POWER_DIOPTERS: 43.75
OD_K2POWER_DIOPTERS: 45.00
OS_K2POWER_DIOPTERS: 45.00

## 2022-08-10 ASSESSMENT — SPHEQUIV_DERIVED
OS_SPHEQUIV: -4
OD_SPHEQUIV: -2.375

## 2022-08-10 ASSESSMENT — REFRACTION_AUTOREFRACTION
OS_CYLINDER: -2.00
OD_AXIS: 104
OS_SPHERE: -3.00
OD_CYLINDER: -2.25
OD_SPHERE: -1.25
OS_AXIS: 094

## 2022-08-10 ASSESSMENT — VISUAL ACUITY
OD_BCVA: 20/30-2
OS_BCVA: CF 1FT

## 2022-08-26 ENCOUNTER — DOCTOR'S OFFICE (OUTPATIENT)
Dept: URBAN - METROPOLITAN AREA CLINIC 125 | Facility: CLINIC | Age: 77
Setting detail: OPHTHALMOLOGY
End: 2022-08-26
Payer: COMMERCIAL

## 2022-08-26 DIAGNOSIS — H43.11: ICD-10-CM

## 2022-08-26 DIAGNOSIS — H33.021: ICD-10-CM

## 2022-08-26 DIAGNOSIS — E11.3411: ICD-10-CM

## 2022-08-26 DIAGNOSIS — E11.3412: ICD-10-CM

## 2022-08-26 PROBLEM — H04.122 DRY EYE; RIGHT EYE, LEFT EYE: Status: ACTIVE | Noted: 2020-11-11

## 2022-08-26 PROBLEM — H43.812 POST VITREOUS DETACHMENT; RIGHT EYE, LEFT EYE: Status: ACTIVE | Noted: 2019-06-19

## 2022-08-26 PROBLEM — H02.403: Status: ACTIVE | Noted: 2017-08-11

## 2022-08-26 PROBLEM — H04.121 DRY EYE; RIGHT EYE, LEFT EYE: Status: ACTIVE | Noted: 2020-11-11

## 2022-08-26 PROBLEM — G24.5 BLEPHAROSPASM ; LEFT EYE: Status: ACTIVE | Noted: 2019-02-08

## 2022-08-26 PROBLEM — H43.811 POST VITREOUS DETACHMENT; RIGHT EYE, LEFT EYE: Status: ACTIVE | Noted: 2019-06-19

## 2022-08-26 PROCEDURE — 99024 POSTOP FOLLOW-UP VISIT: CPT | Performed by: OPHTHALMOLOGY

## 2022-08-26 PROCEDURE — 92134 CPTRZ OPH DX IMG PST SGM RTA: CPT | Performed by: OPHTHALMOLOGY

## 2022-08-26 ASSESSMENT — REFRACTION_AUTOREFRACTION
OD_SPHERE: -1.25
OD_CYLINDER: -2.25
OS_SPHERE: -3.00
OS_CYLINDER: -2.00
OD_AXIS: 104
OS_AXIS: 094

## 2022-08-26 ASSESSMENT — AXIALLENGTH_DERIVED
OS_AL: 24.8974
OD_AL: 24.3576

## 2022-08-26 ASSESSMENT — KERATOMETRY
OS_K2POWER_DIOPTERS: 45.00
OS_K1POWER_DIOPTERS: 43.75
OD_K1POWER_DIOPTERS: 43.00
OD_AXISANGLE_DEGREES: 106
OS_AXISANGLE_DEGREES: 103
OD_K2POWER_DIOPTERS: 45.00

## 2022-08-26 ASSESSMENT — VISUAL ACUITY
OD_BCVA: 20/30
OS_BCVA: CF 6FT

## 2022-08-26 ASSESSMENT — SPHEQUIV_DERIVED
OD_SPHEQUIV: -2.375
OS_SPHEQUIV: -4

## 2022-08-26 ASSESSMENT — SUPERFICIAL PUNCTATE KERATITIS (SPK)
OS_SPK: 1+
OD_SPK: 1+

## 2022-10-18 ENCOUNTER — DOCTOR'S OFFICE (OUTPATIENT)
Dept: URBAN - METROPOLITAN AREA CLINIC 125 | Facility: CLINIC | Age: 77
Setting detail: OPHTHALMOLOGY
End: 2022-10-18
Payer: COMMERCIAL

## 2022-10-18 VITALS — HEIGHT: 55 IN

## 2022-10-18 DIAGNOSIS — E11.3412: ICD-10-CM

## 2022-10-18 DIAGNOSIS — E11.3411: ICD-10-CM

## 2022-10-18 DIAGNOSIS — H43.11: ICD-10-CM

## 2022-10-18 DIAGNOSIS — H33.021: ICD-10-CM

## 2022-10-18 PROCEDURE — 92250 FUNDUS PHOTOGRAPHY W/I&R: CPT | Performed by: OPHTHALMOLOGY

## 2022-10-18 PROCEDURE — 92235 FLUORESCEIN ANGRPH MLTIFRAME: CPT | Performed by: OPHTHALMOLOGY

## 2022-10-18 PROCEDURE — 67028 INJECTION EYE DRUG: CPT | Performed by: OPHTHALMOLOGY

## 2022-10-18 PROCEDURE — 99213 OFFICE O/P EST LOW 20 MIN: CPT | Performed by: OPHTHALMOLOGY

## 2022-10-18 ASSESSMENT — KERATOMETRY
OD_AXISANGLE_DEGREES: 106
OS_K1POWER_DIOPTERS: 43.75
OS_K2POWER_DIOPTERS: 45.00
OD_K2POWER_DIOPTERS: 45.00
OD_K1POWER_DIOPTERS: 43.00
OS_AXISANGLE_DEGREES: 103

## 2022-10-18 ASSESSMENT — REFRACTION_AUTOREFRACTION
OD_CYLINDER: -2.25
OS_CYLINDER: -2.00
OD_SPHERE: -1.25
OS_SPHERE: -3.00
OS_AXIS: 094
OD_AXIS: 104

## 2022-10-18 ASSESSMENT — CONFRONTATIONAL VISUAL FIELD TEST (CVF)
OD_FINDINGS: FULL
OS_FINDINGS: FULL

## 2022-10-18 ASSESSMENT — SUPERFICIAL PUNCTATE KERATITIS (SPK)
OS_SPK: 1+
OD_SPK: 1+

## 2022-10-18 ASSESSMENT — AXIALLENGTH_DERIVED
OS_AL: 24.8974
OD_AL: 24.3576

## 2022-10-18 ASSESSMENT — VISUAL ACUITY
OS_BCVA: CF 6FT
OD_BCVA: 20/40-2

## 2022-10-18 ASSESSMENT — SPHEQUIV_DERIVED
OD_SPHEQUIV: -2.375
OS_SPHEQUIV: -4

## 2022-11-11 ENCOUNTER — DOCTOR'S OFFICE (OUTPATIENT)
Dept: URBAN - METROPOLITAN AREA CLINIC 125 | Facility: CLINIC | Age: 77
Setting detail: OPHTHALMOLOGY
End: 2022-11-11
Payer: COMMERCIAL

## 2022-11-11 DIAGNOSIS — E11.3411: ICD-10-CM

## 2022-11-11 DIAGNOSIS — E11.3412: ICD-10-CM

## 2022-11-11 PROCEDURE — 99212 OFFICE O/P EST SF 10 MIN: CPT | Performed by: OPHTHALMOLOGY

## 2022-11-11 ASSESSMENT — REFRACTION_AUTOREFRACTION
OS_SPHERE: -3.00
OD_AXIS: 104
OS_CYLINDER: -2.00
OD_SPHERE: -1.25
OD_CYLINDER: -2.25
OS_AXIS: 094

## 2022-11-11 ASSESSMENT — KERATOMETRY
OD_K1POWER_DIOPTERS: 43.00
OS_K1POWER_DIOPTERS: 43.75
OS_K2POWER_DIOPTERS: 45.00
OD_AXISANGLE_DEGREES: 106
OS_AXISANGLE_DEGREES: 103
OD_K2POWER_DIOPTERS: 45.00

## 2022-11-11 ASSESSMENT — SPHEQUIV_DERIVED
OS_SPHEQUIV: -4
OD_SPHEQUIV: -2.375

## 2022-11-11 ASSESSMENT — CONFRONTATIONAL VISUAL FIELD TEST (CVF)
OS_FINDINGS: FULL
OD_FINDINGS: FULL

## 2022-11-11 ASSESSMENT — SUPERFICIAL PUNCTATE KERATITIS (SPK)
OS_SPK: 1+
OD_SPK: 1+

## 2022-11-11 ASSESSMENT — VISUAL ACUITY
OD_BCVA: 20/CF 6
OS_BCVA: CF 6FT

## 2022-11-11 ASSESSMENT — AXIALLENGTH_DERIVED
OS_AL: 24.8974
OD_AL: 24.3576

## 2022-11-16 ENCOUNTER — DOCTOR'S OFFICE (OUTPATIENT)
Dept: URBAN - METROPOLITAN AREA CLINIC 125 | Facility: CLINIC | Age: 77
Setting detail: OPHTHALMOLOGY
End: 2022-11-16
Payer: COMMERCIAL

## 2022-11-16 DIAGNOSIS — E11.3412: ICD-10-CM

## 2022-11-16 DIAGNOSIS — E11.3411: ICD-10-CM

## 2022-11-16 PROCEDURE — 99213 OFFICE O/P EST LOW 20 MIN: CPT | Performed by: OPHTHALMOLOGY

## 2022-11-16 PROCEDURE — 92134 CPTRZ OPH DX IMG PST SGM RTA: CPT | Performed by: OPHTHALMOLOGY

## 2022-11-16 ASSESSMENT — REFRACTION_AUTOREFRACTION
OD_CYLINDER: -2.25
OS_AXIS: 094
OD_SPHERE: -1.25
OS_SPHERE: -3.00
OD_AXIS: 104
OS_CYLINDER: -2.00

## 2022-11-16 ASSESSMENT — VISUAL ACUITY
OD_BCVA: 20/50
OS_BCVA: 20/200-1

## 2022-11-16 ASSESSMENT — SPHEQUIV_DERIVED
OD_SPHEQUIV: -2.375
OS_SPHEQUIV: -4

## 2022-11-16 ASSESSMENT — KERATOMETRY
OS_AXISANGLE_DEGREES: 103
OD_K2POWER_DIOPTERS: 45.00
OD_AXISANGLE_DEGREES: 106
OS_K2POWER_DIOPTERS: 45.00
OS_K1POWER_DIOPTERS: 43.75
OD_K1POWER_DIOPTERS: 43.00

## 2022-11-16 ASSESSMENT — AXIALLENGTH_DERIVED
OD_AL: 24.3576
OS_AL: 24.8974

## 2022-11-16 ASSESSMENT — CONFRONTATIONAL VISUAL FIELD TEST (CVF)
OS_FINDINGS: FULL
OD_FINDINGS: FULL

## 2022-11-23 ENCOUNTER — DOCTOR'S OFFICE (OUTPATIENT)
Dept: URBAN - METROPOLITAN AREA CLINIC 125 | Facility: CLINIC | Age: 77
Setting detail: OPHTHALMOLOGY
End: 2022-11-23
Payer: COMMERCIAL

## 2022-11-23 ENCOUNTER — RX ONLY (RX ONLY)
Age: 77
End: 2022-11-23

## 2022-11-23 DIAGNOSIS — E11.3411: ICD-10-CM

## 2022-11-23 PROCEDURE — 67028 INJECTION EYE DRUG: CPT | Performed by: OPHTHALMOLOGY

## 2022-11-23 ASSESSMENT — REFRACTION_AUTOREFRACTION
OD_CYLINDER: -2.25
OS_CYLINDER: -2.00
OS_AXIS: 094
OD_SPHERE: -1.25
OD_AXIS: 104
OS_SPHERE: -3.00

## 2022-11-23 ASSESSMENT — KERATOMETRY
OD_K2POWER_DIOPTERS: 45.00
OD_AXISANGLE_DEGREES: 106
OD_K1POWER_DIOPTERS: 43.00
OS_AXISANGLE_DEGREES: 103
OS_K2POWER_DIOPTERS: 45.00
OS_K1POWER_DIOPTERS: 43.75

## 2022-11-23 ASSESSMENT — CONFRONTATIONAL VISUAL FIELD TEST (CVF)
OS_FINDINGS: FULL
OD_FINDINGS: FULL

## 2022-11-23 ASSESSMENT — AXIALLENGTH_DERIVED
OS_AL: 24.8974
OD_AL: 24.3576

## 2022-11-23 ASSESSMENT — SPHEQUIV_DERIVED
OD_SPHEQUIV: -2.375
OS_SPHEQUIV: -4

## 2022-11-23 ASSESSMENT — VISUAL ACUITY
OS_BCVA: 20/200
OD_BCVA: 20/40+2

## 2022-11-30 ENCOUNTER — DOCTOR'S OFFICE (OUTPATIENT)
Dept: URBAN - METROPOLITAN AREA CLINIC 125 | Facility: CLINIC | Age: 77
Setting detail: OPHTHALMOLOGY
End: 2022-11-30
Payer: COMMERCIAL

## 2022-11-30 DIAGNOSIS — E11.3412: ICD-10-CM

## 2022-11-30 PROCEDURE — 67028 INJECTION EYE DRUG: CPT | Performed by: OPHTHALMOLOGY

## 2022-11-30 ASSESSMENT — REFRACTION_AUTOREFRACTION
OD_AXIS: 104
OS_SPHERE: -3.00
OD_SPHERE: -1.25
OS_AXIS: 094
OS_CYLINDER: -2.00
OD_CYLINDER: -2.25

## 2022-11-30 ASSESSMENT — VISUAL ACUITY
OD_BCVA: 20/40-2
OS_BCVA: 20/200

## 2022-11-30 ASSESSMENT — SPHEQUIV_DERIVED
OS_SPHEQUIV: -4
OD_SPHEQUIV: -2.375

## 2022-11-30 ASSESSMENT — CONFRONTATIONAL VISUAL FIELD TEST (CVF)
OS_FINDINGS: FULL
OD_FINDINGS: FULL

## 2022-12-14 ENCOUNTER — DOCTOR'S OFFICE (OUTPATIENT)
Dept: URBAN - METROPOLITAN AREA CLINIC 125 | Facility: CLINIC | Age: 77
Setting detail: OPHTHALMOLOGY
End: 2022-12-14
Payer: COMMERCIAL

## 2022-12-14 VITALS — HEIGHT: 55 IN

## 2022-12-14 DIAGNOSIS — H04.121: ICD-10-CM

## 2022-12-14 DIAGNOSIS — H43.11: ICD-10-CM

## 2022-12-14 DIAGNOSIS — E11.3411: ICD-10-CM

## 2022-12-14 DIAGNOSIS — E11.3412: ICD-10-CM

## 2022-12-14 DIAGNOSIS — H33.021: ICD-10-CM

## 2022-12-14 DIAGNOSIS — H04.122: ICD-10-CM

## 2022-12-14 PROCEDURE — 92134 CPTRZ OPH DX IMG PST SGM RTA: CPT | Performed by: OPHTHALMOLOGY

## 2022-12-14 PROCEDURE — 83861 MICROFLUID ANALY TEARS: CPT | Performed by: OPHTHALMOLOGY

## 2022-12-14 PROCEDURE — 99213 OFFICE O/P EST LOW 20 MIN: CPT | Performed by: OPHTHALMOLOGY

## 2022-12-14 ASSESSMENT — KERATOMETRY
OS_K2POWER_DIOPTERS: 45.00
OD_K2POWER_DIOPTERS: 45.00
OS_K1POWER_DIOPTERS: 43.75
OD_K1POWER_DIOPTERS: 43.00
OS_AXISANGLE_DEGREES: 103
OD_AXISANGLE_DEGREES: 106

## 2022-12-14 ASSESSMENT — REFRACTION_AUTOREFRACTION
OS_AXIS: 094
OD_CYLINDER: -2.25
OD_SPHERE: -1.25
OS_SPHERE: -3.00
OD_AXIS: 104
OS_CYLINDER: -2.00

## 2022-12-14 ASSESSMENT — SUPERFICIAL PUNCTATE KERATITIS (SPK)
OS_SPK: 1+
OD_SPK: 1+

## 2022-12-14 ASSESSMENT — SPHEQUIV_DERIVED
OD_SPHEQUIV: -2.375
OS_SPHEQUIV: -4

## 2022-12-14 ASSESSMENT — CONFRONTATIONAL VISUAL FIELD TEST (CVF)
OD_FINDINGS: FULL
OS_FINDINGS: FULL

## 2022-12-14 ASSESSMENT — AXIALLENGTH_DERIVED
OD_AL: 24.3576
OS_AL: 24.8974

## 2022-12-14 ASSESSMENT — VISUAL ACUITY
OD_BCVA: 20/30-2
OS_BCVA: 20/200

## 2022-12-21 ENCOUNTER — DOCTOR'S OFFICE (OUTPATIENT)
Dept: URBAN - METROPOLITAN AREA CLINIC 125 | Facility: CLINIC | Age: 77
Setting detail: OPHTHALMOLOGY
End: 2022-12-21
Payer: COMMERCIAL

## 2022-12-21 DIAGNOSIS — E11.3411: ICD-10-CM

## 2022-12-21 PROCEDURE — 67028 INJECTION EYE DRUG: CPT | Performed by: OPHTHALMOLOGY

## 2022-12-21 ASSESSMENT — REFRACTION_AUTOREFRACTION
OD_SPHERE: -1.25
OS_CYLINDER: -2.00
OD_CYLINDER: -2.25
OD_AXIS: 104
OS_AXIS: 094
OS_SPHERE: -3.00

## 2022-12-21 ASSESSMENT — AXIALLENGTH_DERIVED
OS_AL: 24.8974
OD_AL: 24.3576

## 2022-12-21 ASSESSMENT — KERATOMETRY
OD_AXISANGLE_DEGREES: 106
OS_K1POWER_DIOPTERS: 43.75
OS_AXISANGLE_DEGREES: 103
OS_K2POWER_DIOPTERS: 45.00
OD_K2POWER_DIOPTERS: 45.00
OD_K1POWER_DIOPTERS: 43.00

## 2022-12-21 ASSESSMENT — SPHEQUIV_DERIVED
OS_SPHEQUIV: -4
OD_SPHEQUIV: -2.375

## 2022-12-21 ASSESSMENT — VISUAL ACUITY
OS_BCVA: 20/400
OD_BCVA: 20/30-2

## 2023-01-10 ENCOUNTER — DOCTOR'S OFFICE (OUTPATIENT)
Dept: URBAN - METROPOLITAN AREA CLINIC 125 | Facility: CLINIC | Age: 78
Setting detail: OPHTHALMOLOGY
End: 2023-01-10
Payer: COMMERCIAL

## 2023-01-10 ENCOUNTER — DOCTOR'S OFFICE (OUTPATIENT)
Dept: URBAN - METROPOLITAN AREA CLINIC 125 | Facility: CLINIC | Age: 78
Setting detail: OPHTHALMOLOGY
End: 2023-01-10

## 2023-01-10 DIAGNOSIS — H52.4: ICD-10-CM

## 2023-01-10 DIAGNOSIS — Z02.4: ICD-10-CM

## 2023-01-10 PROCEDURE — 92015 DETERMINE REFRACTIVE STATE: CPT | Performed by: OPHTHALMOLOGY

## 2023-01-10 PROCEDURE — 92083 EXTENDED VISUAL FIELD XM: CPT | Performed by: OPHTHALMOLOGY

## 2023-01-10 ASSESSMENT — REFRACTION_AUTOREFRACTION
OD_AXIS: 168
OS_SPHERE: -1.75
OD_SPHERE: -2.75
OS_CYLINDER: -2.00
OS_AXIS: 100
OD_CYLINDER: -4.75

## 2023-01-10 ASSESSMENT — KERATOMETRY
OS_AXISANGLE_DEGREES: 013
OD_K1POWER_DIOPTERS: 42.00
OD_AXISANGLE_DEGREES: 007
OD_K2POWER_DIOPTERS: 44.50
OS_K1POWER_DIOPTERS: 43.50
OS_K2POWER_DIOPTERS: 44.50

## 2023-01-10 ASSESSMENT — AXIALLENGTH_DERIVED
OS_AL: 24.5144
OD_AL: 24.4963
OD_AL: 25.8833
OS_AL: 24.834

## 2023-01-10 ASSESSMENT — CONFRONTATIONAL VISUAL FIELD TEST (CVF)
OS_FINDINGS: FULL
OD_FINDINGS: FULL

## 2023-01-10 ASSESSMENT — REFRACTION_CURRENTRX
OD_AXIS: 092
OD_CYLINDER: -2.25
OS_OVR_VA: 20/
OS_ADD: +2.25
OD_SPHERE: -0.25
OD_ADD: +2.25
OS_CYLINDER: -2.75
OD_OVR_VA: 20/
OS_SPHERE: -0.50
OS_AXIS: 104

## 2023-01-10 ASSESSMENT — REFRACTION_MANIFEST
OD_CYLINDER: -2.00
OS_SPHERE: -2.50
OD_ADD: +3.00
OD_VA1: 20/200
OD_AXIS: 95
OS_ADD: +3.00
OS_VA1: 20/30-
OD_SPHERE: -1.00
OD_VA2: 20/20
OS_CYLINDER: -2.00
OS_AXIS: 100

## 2023-01-10 ASSESSMENT — LID EXAM ASSESSMENTS
OD_COMMENTS: TRACE PERIORBITAL ECCHYMOSIS AND EDEMA
OS_COMMENTS: +1 PERIORBITAL ECCHYMOSIS AND EDEMA

## 2023-01-10 ASSESSMENT — SPHEQUIV_DERIVED
OD_SPHEQUIV: -2
OS_SPHEQUIV: -2.75
OD_SPHEQUIV: -5.125
OS_SPHEQUIV: -3.5

## 2023-01-10 ASSESSMENT — SUPERFICIAL PUNCTATE KERATITIS (SPK)
OS_SPK: 1+
OD_SPK: 1+

## 2023-01-10 ASSESSMENT — VISUAL ACUITY
OD_BCVA: 20/30
OS_BCVA: 20/150-2

## 2023-01-11 ENCOUNTER — DOCTOR'S OFFICE (OUTPATIENT)
Dept: URBAN - METROPOLITAN AREA CLINIC 125 | Facility: CLINIC | Age: 78
Setting detail: OPHTHALMOLOGY
End: 2023-01-11
Payer: COMMERCIAL

## 2023-01-11 DIAGNOSIS — E11.3412: ICD-10-CM

## 2023-01-11 PROCEDURE — 67028 INJECTION EYE DRUG: CPT | Performed by: OPHTHALMOLOGY

## 2023-01-11 ASSESSMENT — REFRACTION_AUTOREFRACTION
OD_SPHERE: -2.75
OS_CYLINDER: -2.00
OS_AXIS: 100
OD_AXIS: 168
OS_SPHERE: -1.75
OD_CYLINDER: -4.75

## 2023-01-11 ASSESSMENT — REFRACTION_MANIFEST
OD_AXIS: 95
OS_ADD: +3.00
OD_VA2: 20/20
OS_VA1: 20/30-
OD_SPHERE: -1.00
OD_VA1: 20/200
OS_SPHERE: -2.50
OD_CYLINDER: -2.00
OS_AXIS: 100
OS_CYLINDER: -2.00
OD_ADD: +3.00

## 2023-01-11 ASSESSMENT — SPHEQUIV_DERIVED
OD_SPHEQUIV: -5.125
OS_SPHEQUIV: -2.75
OS_SPHEQUIV: -3.5
OD_SPHEQUIV: -2

## 2023-01-11 ASSESSMENT — AXIALLENGTH_DERIVED
OS_AL: 24.5144
OD_AL: 25.8833
OS_AL: 24.834
OD_AL: 24.4963

## 2023-01-11 ASSESSMENT — REFRACTION_CURRENTRX
OS_CYLINDER: -2.75
OS_OVR_VA: 20/
OD_ADD: +2.25
OS_AXIS: 104
OD_SPHERE: -0.25
OD_CYLINDER: -2.25
OS_ADD: +2.25
OD_AXIS: 092
OS_SPHERE: -0.50
OD_OVR_VA: 20/

## 2023-01-11 ASSESSMENT — CONFRONTATIONAL VISUAL FIELD TEST (CVF)
OS_FINDINGS: FULL
OD_FINDINGS: FULL

## 2023-01-11 ASSESSMENT — KERATOMETRY
OD_K2POWER_DIOPTERS: 44.50
OS_K1POWER_DIOPTERS: 43.50
OD_K1POWER_DIOPTERS: 42.00
OS_AXISANGLE_DEGREES: 013
OD_AXISANGLE_DEGREES: 007
OS_K2POWER_DIOPTERS: 44.50

## 2023-01-11 ASSESSMENT — VISUAL ACUITY
OS_BCVA: 20/150-2
OD_BCVA: 20/30-2

## 2023-01-18 ENCOUNTER — DOCTOR'S OFFICE (OUTPATIENT)
Dept: URBAN - METROPOLITAN AREA CLINIC 125 | Facility: CLINIC | Age: 78
Setting detail: OPHTHALMOLOGY
End: 2023-01-18
Payer: COMMERCIAL

## 2023-01-18 DIAGNOSIS — H43.811: ICD-10-CM

## 2023-01-18 DIAGNOSIS — E11.3412: ICD-10-CM

## 2023-01-18 DIAGNOSIS — H33.021: ICD-10-CM

## 2023-01-18 DIAGNOSIS — E11.3411: ICD-10-CM

## 2023-01-18 PROCEDURE — 99213 OFFICE O/P EST LOW 20 MIN: CPT | Performed by: OPHTHALMOLOGY

## 2023-01-18 PROCEDURE — 92134 CPTRZ OPH DX IMG PST SGM RTA: CPT | Performed by: OPHTHALMOLOGY

## 2023-01-18 ASSESSMENT — REFRACTION_CURRENTRX
OS_CYLINDER: -2.75
OD_SPHERE: -0.25
OS_OVR_VA: 20/
OD_OVR_VA: 20/
OS_ADD: +2.25
OS_SPHERE: -0.50
OD_ADD: +2.25
OD_AXIS: 092
OS_AXIS: 104
OD_CYLINDER: -2.25

## 2023-01-18 ASSESSMENT — CONFRONTATIONAL VISUAL FIELD TEST (CVF)
OD_FINDINGS: FULL
OS_FINDINGS: FULL

## 2023-01-18 ASSESSMENT — AXIALLENGTH_DERIVED
OD_AL: 24.4963
OS_AL: 24.834
OD_AL: 25.8833
OS_AL: 24.5144

## 2023-01-18 ASSESSMENT — REFRACTION_MANIFEST
OD_ADD: +3.00
OD_SPHERE: -1.00
OD_VA2: 20/20
OS_ADD: +3.00
OD_VA1: 20/200
OS_VA1: 20/30-
OD_AXIS: 95
OS_SPHERE: -2.50
OS_CYLINDER: -2.00
OD_CYLINDER: -2.00
OS_AXIS: 100

## 2023-01-18 ASSESSMENT — SPHEQUIV_DERIVED
OS_SPHEQUIV: -3.5
OS_SPHEQUIV: -2.75
OD_SPHEQUIV: -5.125
OD_SPHEQUIV: -2

## 2023-01-18 ASSESSMENT — SUPERFICIAL PUNCTATE KERATITIS (SPK)
OD_SPK: 1+
OS_SPK: 1+

## 2023-01-18 ASSESSMENT — REFRACTION_AUTOREFRACTION
OD_CYLINDER: -4.75
OS_AXIS: 100
OD_SPHERE: -2.75
OD_AXIS: 168
OS_CYLINDER: -2.00
OS_SPHERE: -1.75

## 2023-01-18 ASSESSMENT — KERATOMETRY
OS_AXISANGLE_DEGREES: 013
OS_K2POWER_DIOPTERS: 44.50
OD_K2POWER_DIOPTERS: 44.50
OS_K1POWER_DIOPTERS: 43.50
OD_AXISANGLE_DEGREES: 007
OD_K1POWER_DIOPTERS: 42.00

## 2023-01-18 ASSESSMENT — VISUAL ACUITY
OS_BCVA: 20/200
OD_BCVA: 20/25

## 2023-01-27 ENCOUNTER — DOCTOR'S OFFICE (OUTPATIENT)
Dept: URBAN - METROPOLITAN AREA CLINIC 125 | Facility: CLINIC | Age: 78
Setting detail: OPHTHALMOLOGY
End: 2023-01-27
Payer: COMMERCIAL

## 2023-01-27 DIAGNOSIS — E11.3411: ICD-10-CM

## 2023-01-27 PROCEDURE — 67028 INJECTION EYE DRUG: CPT | Performed by: OPHTHALMOLOGY

## 2023-01-27 ASSESSMENT — REFRACTION_AUTOREFRACTION
OS_AXIS: 100
OD_CYLINDER: -4.75
OS_CYLINDER: -2.00
OS_SPHERE: -1.75
OD_AXIS: 168
OD_SPHERE: -2.75

## 2023-01-27 ASSESSMENT — KERATOMETRY
OD_K1POWER_DIOPTERS: 42.00
OS_K2POWER_DIOPTERS: 44.50
OS_K1POWER_DIOPTERS: 43.50
OD_K2POWER_DIOPTERS: 44.50
OD_AXISANGLE_DEGREES: 007
OS_AXISANGLE_DEGREES: 013

## 2023-01-27 ASSESSMENT — SPHEQUIV_DERIVED
OS_SPHEQUIV: -2.75
OD_SPHEQUIV: -5.125

## 2023-01-27 ASSESSMENT — VISUAL ACUITY
OS_BCVA: 20/200
OD_BCVA: 20/25

## 2023-01-27 ASSESSMENT — AXIALLENGTH_DERIVED
OS_AL: 24.5144
OD_AL: 25.8833

## 2023-02-08 ENCOUNTER — DOCTOR'S OFFICE (OUTPATIENT)
Dept: URBAN - METROPOLITAN AREA CLINIC 125 | Facility: CLINIC | Age: 78
Setting detail: OPHTHALMOLOGY
End: 2023-02-08
Payer: COMMERCIAL

## 2023-02-08 ENCOUNTER — RX ONLY (RX ONLY)
Age: 78
End: 2023-02-08

## 2023-02-08 DIAGNOSIS — E11.3412: ICD-10-CM

## 2023-02-08 PROBLEM — H52.203 ASTIGMATISM; BOTH EYES: Status: ACTIVE | Noted: 2023-01-10

## 2023-02-08 PROBLEM — H52.4 PRESBYOPIA: Status: ACTIVE | Noted: 2023-01-10

## 2023-02-08 PROBLEM — H52.03 HYPEROPIA; BOTH EYES: Status: ACTIVE | Noted: 2023-01-10

## 2023-02-08 PROBLEM — Z02.4 ENCOUNTER FOR EXAMINATION FOR DRIVING LICENSE: Status: ACTIVE | Noted: 2023-01-10

## 2023-02-08 PROCEDURE — 67028 INJECTION EYE DRUG: CPT | Performed by: OPHTHALMOLOGY

## 2023-02-08 ASSESSMENT — REFRACTION_AUTOREFRACTION
OD_SPHERE: -2.75
OS_AXIS: 100
OS_CYLINDER: -2.00
OS_SPHERE: -1.75
OD_CYLINDER: -4.75
OD_AXIS: 168

## 2023-02-08 ASSESSMENT — KERATOMETRY
OD_AXISANGLE_DEGREES: 007
OD_K1POWER_DIOPTERS: 42.00
OS_AXISANGLE_DEGREES: 013
OS_K2POWER_DIOPTERS: 44.50
OS_K1POWER_DIOPTERS: 43.50
OD_K2POWER_DIOPTERS: 44.50

## 2023-02-08 ASSESSMENT — AXIALLENGTH_DERIVED
OS_AL: 24.5144
OD_AL: 25.8833

## 2023-02-08 ASSESSMENT — VISUAL ACUITY
OS_BCVA: 20/200
OD_BCVA: 20/25 -1

## 2023-02-08 ASSESSMENT — SPHEQUIV_DERIVED
OS_SPHEQUIV: -2.75
OD_SPHEQUIV: -5.125

## 2023-02-15 ENCOUNTER — DOCTOR'S OFFICE (OUTPATIENT)
Dept: URBAN - METROPOLITAN AREA CLINIC 125 | Facility: CLINIC | Age: 78
Setting detail: OPHTHALMOLOGY
End: 2023-02-15
Payer: COMMERCIAL

## 2023-02-15 DIAGNOSIS — H33.021: ICD-10-CM

## 2023-02-15 DIAGNOSIS — H43.811: ICD-10-CM

## 2023-02-15 DIAGNOSIS — E11.3411: ICD-10-CM

## 2023-02-15 DIAGNOSIS — H43.812: ICD-10-CM

## 2023-02-15 DIAGNOSIS — H43.11: ICD-10-CM

## 2023-02-15 DIAGNOSIS — E11.3412: ICD-10-CM

## 2023-02-15 PROBLEM — H33.21 RETINAL DETACHMENT, SEROUS; RIGHT EYE: Status: ACTIVE | Noted: 2023-02-15

## 2023-02-15 PROBLEM — E11.3413 DM TYPE 2; BOTH SEVERE WITH ME: Status: ACTIVE | Noted: 2023-02-15

## 2023-02-15 PROCEDURE — 76512 OPH US DX B-SCAN: CPT | Performed by: OPHTHALMOLOGY

## 2023-02-15 PROCEDURE — 92134 CPTRZ OPH DX IMG PST SGM RTA: CPT | Performed by: OPHTHALMOLOGY

## 2023-02-15 PROCEDURE — 99213 OFFICE O/P EST LOW 20 MIN: CPT | Performed by: OPHTHALMOLOGY

## 2023-02-15 ASSESSMENT — VISUAL ACUITY
OD_BCVA: 20/30-2
OS_BCVA: 20/200-1

## 2023-02-15 ASSESSMENT — REFRACTION_AUTOREFRACTION
OS_SPHERE: -1.75
OD_AXIS: 168
OD_SPHERE: -2.75
OS_CYLINDER: -2.00
OS_AXIS: 100
OD_CYLINDER: -4.75

## 2023-02-15 ASSESSMENT — KERATOMETRY
OS_K1POWER_DIOPTERS: 43.50
OD_K2POWER_DIOPTERS: 44.50
OD_K1POWER_DIOPTERS: 42.00
OS_K2POWER_DIOPTERS: 44.50
OS_AXISANGLE_DEGREES: 013
OD_AXISANGLE_DEGREES: 007

## 2023-02-15 ASSESSMENT — CONFRONTATIONAL VISUAL FIELD TEST (CVF)
OD_FINDINGS: FULL
OS_FINDINGS: FULL

## 2023-02-15 ASSESSMENT — SUPERFICIAL PUNCTATE KERATITIS (SPK)
OD_SPK: 1+
OS_SPK: 1+

## 2023-02-15 ASSESSMENT — LID EXAM ASSESSMENTS
OS_COMMENTS: +1 PERIORBITAL ECCHYMOSIS AND EDEMA
OD_COMMENTS: TRACE PERIORBITAL ECCHYMOSIS AND EDEMA

## 2023-02-15 ASSESSMENT — SPHEQUIV_DERIVED
OS_SPHEQUIV: -2.75
OD_SPHEQUIV: -5.125

## 2023-02-15 ASSESSMENT — AXIALLENGTH_DERIVED
OS_AL: 24.5144
OD_AL: 25.8833

## 2023-03-15 ENCOUNTER — DOCTOR'S OFFICE (OUTPATIENT)
Dept: URBAN - METROPOLITAN AREA CLINIC 125 | Facility: CLINIC | Age: 78
Setting detail: OPHTHALMOLOGY
End: 2023-03-15
Payer: COMMERCIAL

## 2023-03-15 DIAGNOSIS — E11.3411: ICD-10-CM

## 2023-03-15 PROCEDURE — 67028 INJECTION EYE DRUG: CPT | Performed by: OPHTHALMOLOGY

## 2023-03-15 ASSESSMENT — REFRACTION_AUTOREFRACTION
OD_CYLINDER: -4.75
OS_CYLINDER: -2.00
OS_SPHERE: -1.75
OS_AXIS: 100
OD_SPHERE: -2.75
OD_AXIS: 168

## 2023-03-15 ASSESSMENT — AXIALLENGTH_DERIVED
OD_AL: 25.8833
OS_AL: 24.5144

## 2023-03-15 ASSESSMENT — KERATOMETRY
OD_K1POWER_DIOPTERS: 42.00
OS_AXISANGLE_DEGREES: 013
OS_K2POWER_DIOPTERS: 44.50
OD_K2POWER_DIOPTERS: 44.50
OD_AXISANGLE_DEGREES: 007
OS_K1POWER_DIOPTERS: 43.50

## 2023-03-15 ASSESSMENT — VISUAL ACUITY
OS_BCVA: 20/150-1
OD_BCVA: 20/30-2

## 2023-03-15 ASSESSMENT — SPHEQUIV_DERIVED
OS_SPHEQUIV: -2.75
OD_SPHEQUIV: -5.125

## 2023-03-29 ENCOUNTER — DOCTOR'S OFFICE (OUTPATIENT)
Dept: URBAN - METROPOLITAN AREA CLINIC 125 | Facility: CLINIC | Age: 78
Setting detail: OPHTHALMOLOGY
End: 2023-03-29
Payer: COMMERCIAL

## 2023-03-29 DIAGNOSIS — E11.3412: ICD-10-CM

## 2023-03-29 PROBLEM — E11.3411 DM TYPE 2; RIGHT SEVERE WITH ME, LEFT SEVERE WITH ME: Status: ACTIVE | Noted: 2023-03-15

## 2023-03-29 PROCEDURE — 67028 INJECTION EYE DRUG: CPT | Performed by: OPHTHALMOLOGY

## 2023-03-29 ASSESSMENT — REFRACTION_AUTOREFRACTION
OD_AXIS: 168
OS_SPHERE: -1.75
OS_CYLINDER: -2.00
OD_SPHERE: -2.75
OS_AXIS: 100
OD_CYLINDER: -4.75

## 2023-03-29 ASSESSMENT — KERATOMETRY
OD_K1POWER_DIOPTERS: 42.00
OS_K1POWER_DIOPTERS: 43.50
OD_AXISANGLE_DEGREES: 007
OD_K2POWER_DIOPTERS: 44.50
OS_AXISANGLE_DEGREES: 013
OS_K2POWER_DIOPTERS: 44.50

## 2023-03-29 ASSESSMENT — VISUAL ACUITY
OD_BCVA: 20/30-2
OS_BCVA: 20/200

## 2023-03-29 ASSESSMENT — SPHEQUIV_DERIVED
OS_SPHEQUIV: -2.75
OD_SPHEQUIV: -5.125

## 2023-03-29 ASSESSMENT — AXIALLENGTH_DERIVED
OS_AL: 24.5144
OD_AL: 25.8833

## 2023-05-17 ENCOUNTER — DOCTOR'S OFFICE (OUTPATIENT)
Dept: URBAN - METROPOLITAN AREA CLINIC 125 | Facility: CLINIC | Age: 78
Setting detail: OPHTHALMOLOGY
End: 2023-05-17
Payer: COMMERCIAL

## 2023-05-17 DIAGNOSIS — E11.3411: ICD-10-CM

## 2023-05-17 PROCEDURE — 67028 INJECTION EYE DRUG: CPT | Performed by: OPHTHALMOLOGY

## 2023-05-17 ASSESSMENT — AXIALLENGTH_DERIVED
OS_AL: 24.5144
OD_AL: 25.8833

## 2023-05-17 ASSESSMENT — REFRACTION_AUTOREFRACTION
OS_SPHERE: -1.75
OD_AXIS: 168
OS_CYLINDER: -2.00
OD_SPHERE: -2.75
OD_CYLINDER: -4.75
OS_AXIS: 100

## 2023-05-17 ASSESSMENT — SPHEQUIV_DERIVED
OS_SPHEQUIV: -2.75
OD_SPHEQUIV: -5.125

## 2023-05-17 ASSESSMENT — KERATOMETRY
OS_K1POWER_DIOPTERS: 43.50
OD_K1POWER_DIOPTERS: 42.00
OS_K2POWER_DIOPTERS: 44.50
OS_AXISANGLE_DEGREES: 013
OD_AXISANGLE_DEGREES: 007
OD_K2POWER_DIOPTERS: 44.50

## 2023-05-17 ASSESSMENT — VISUAL ACUITY
OS_BCVA: 20/200
OD_BCVA: 20/30-2

## 2023-05-31 ENCOUNTER — DOCTOR'S OFFICE (OUTPATIENT)
Dept: URBAN - METROPOLITAN AREA CLINIC 125 | Facility: CLINIC | Age: 78
Setting detail: OPHTHALMOLOGY
End: 2023-05-31
Payer: COMMERCIAL

## 2023-05-31 DIAGNOSIS — E11.3412: ICD-10-CM

## 2023-05-31 PROCEDURE — 67028 INJECTION EYE DRUG: CPT | Performed by: OPHTHALMOLOGY

## 2023-05-31 ASSESSMENT — REFRACTION_AUTOREFRACTION
OD_AXIS: 168
OD_CYLINDER: -4.75
OS_AXIS: 100
OS_CYLINDER: -2.00
OS_SPHERE: -1.75
OD_SPHERE: -2.75

## 2023-05-31 ASSESSMENT — SPHEQUIV_DERIVED
OD_SPHEQUIV: -5.125
OS_SPHEQUIV: -2.75

## 2023-05-31 ASSESSMENT — VISUAL ACUITY
OS_BCVA: 20/200
OD_BCVA: 20/30+2

## 2023-06-07 ENCOUNTER — DOCTOR'S OFFICE (OUTPATIENT)
Dept: URBAN - METROPOLITAN AREA CLINIC 125 | Facility: CLINIC | Age: 78
Setting detail: OPHTHALMOLOGY
End: 2023-06-07
Payer: COMMERCIAL

## 2023-06-07 DIAGNOSIS — E11.3412: ICD-10-CM

## 2023-06-07 DIAGNOSIS — H43.811: ICD-10-CM

## 2023-06-07 DIAGNOSIS — H33.021: ICD-10-CM

## 2023-06-07 DIAGNOSIS — E11.3411: ICD-10-CM

## 2023-06-07 PROCEDURE — 92014 COMPRE OPH EXAM EST PT 1/>: CPT | Performed by: OPHTHALMOLOGY

## 2023-06-07 PROCEDURE — 92134 CPTRZ OPH DX IMG PST SGM RTA: CPT | Performed by: OPHTHALMOLOGY

## 2023-06-07 ASSESSMENT — SPHEQUIV_DERIVED
OS_SPHEQUIV: -2.75
OD_SPHEQUIV: -5.125

## 2023-06-07 ASSESSMENT — REFRACTION_AUTOREFRACTION
OS_CYLINDER: -2.00
OS_AXIS: 100
OD_CYLINDER: -4.75
OS_SPHERE: -1.75
OD_SPHERE: -2.75
OD_AXIS: 168

## 2023-06-07 ASSESSMENT — VISUAL ACUITY
OS_BCVA: 20/200
OD_BCVA: 20/40-2

## 2023-06-07 ASSESSMENT — KERATOMETRY
OD_AXISANGLE_DEGREES: 007
OS_AXISANGLE_DEGREES: 013
OD_K2POWER_DIOPTERS: 44.50
OS_K1POWER_DIOPTERS: 43.50
OD_K1POWER_DIOPTERS: 42.00
OS_K2POWER_DIOPTERS: 44.50

## 2023-06-07 ASSESSMENT — SUPERFICIAL PUNCTATE KERATITIS (SPK)
OS_SPK: 1+
OD_SPK: 1+

## 2023-06-07 ASSESSMENT — AXIALLENGTH_DERIVED
OS_AL: 24.5144
OD_AL: 25.8833

## 2023-06-07 ASSESSMENT — CONFRONTATIONAL VISUAL FIELD TEST (CVF)
OD_FINDINGS: FULL
OS_FINDINGS: FULL

## 2023-06-14 ENCOUNTER — DOCTOR'S OFFICE (OUTPATIENT)
Dept: URBAN - METROPOLITAN AREA CLINIC 125 | Facility: CLINIC | Age: 78
Setting detail: OPHTHALMOLOGY
End: 2023-06-14
Payer: COMMERCIAL

## 2023-06-14 DIAGNOSIS — E11.3411: ICD-10-CM

## 2023-06-14 PROCEDURE — 67028 INJECTION EYE DRUG: CPT | Performed by: OPHTHALMOLOGY

## 2023-06-14 ASSESSMENT — REFRACTION_AUTOREFRACTION
OD_SPHERE: -2.75
OS_SPHERE: -1.75
OD_AXIS: 168
OS_AXIS: 100
OS_CYLINDER: -2.00
OD_CYLINDER: -4.75

## 2023-06-14 ASSESSMENT — AXIALLENGTH_DERIVED
OD_AL: 25.8833
OS_AL: 24.5144

## 2023-06-14 ASSESSMENT — KERATOMETRY
OS_K2POWER_DIOPTERS: 44.50
OS_K1POWER_DIOPTERS: 43.50
OD_K2POWER_DIOPTERS: 44.50
OD_AXISANGLE_DEGREES: 007
OS_AXISANGLE_DEGREES: 013
OD_K1POWER_DIOPTERS: 42.00

## 2023-06-14 ASSESSMENT — SPHEQUIV_DERIVED
OD_SPHEQUIV: -5.125
OS_SPHEQUIV: -2.75

## 2023-06-14 ASSESSMENT — VISUAL ACUITY
OD_BCVA: 20/40-2
OS_BCVA: 20/200

## 2023-06-20 ENCOUNTER — DOCTOR'S OFFICE (OUTPATIENT)
Dept: URBAN - METROPOLITAN AREA CLINIC 125 | Facility: CLINIC | Age: 78
Setting detail: OPHTHALMOLOGY
End: 2023-06-20
Payer: COMMERCIAL

## 2023-06-20 DIAGNOSIS — H11.31: ICD-10-CM

## 2023-06-20 PROCEDURE — 99213 OFFICE O/P EST LOW 20 MIN: CPT | Performed by: OPHTHALMOLOGY

## 2023-06-20 ASSESSMENT — KERATOMETRY
OD_K1POWER_DIOPTERS: 42.00
OS_AXISANGLE_DEGREES: 013
OS_K1POWER_DIOPTERS: 43.50
OD_K2POWER_DIOPTERS: 44.50
OS_K2POWER_DIOPTERS: 44.50
OD_AXISANGLE_DEGREES: 007

## 2023-06-20 ASSESSMENT — VISUAL ACUITY
OS_BCVA: 20/200
OD_BCVA: 20/40-2

## 2023-06-20 ASSESSMENT — SPHEQUIV_DERIVED
OD_SPHEQUIV: -5.125
OS_SPHEQUIV: -2.75

## 2023-06-20 ASSESSMENT — REFRACTION_AUTOREFRACTION
OS_CYLINDER: -2.00
OS_SPHERE: -1.75
OD_CYLINDER: -4.75
OD_AXIS: 168
OD_SPHERE: -2.75
OS_AXIS: 100

## 2023-06-20 ASSESSMENT — AXIALLENGTH_DERIVED
OD_AL: 25.8833
OS_AL: 24.5144

## 2023-06-20 ASSESSMENT — CONFRONTATIONAL VISUAL FIELD TEST (CVF)
OS_FINDINGS: FULL
OD_FINDINGS: FULL

## 2023-06-20 ASSESSMENT — SUPERFICIAL PUNCTATE KERATITIS (SPK)
OS_SPK: 1+
OD_SPK: 1+

## 2023-06-21 ENCOUNTER — DOCTOR'S OFFICE (OUTPATIENT)
Dept: URBAN - METROPOLITAN AREA CLINIC 125 | Facility: CLINIC | Age: 78
Setting detail: OPHTHALMOLOGY
End: 2023-06-21
Payer: COMMERCIAL

## 2023-06-21 DIAGNOSIS — H11.31: ICD-10-CM

## 2023-06-21 DIAGNOSIS — E11.3412: ICD-10-CM

## 2023-06-21 DIAGNOSIS — H33.21: ICD-10-CM

## 2023-06-21 DIAGNOSIS — E11.3411: ICD-10-CM

## 2023-06-21 DIAGNOSIS — H43.11: ICD-10-CM

## 2023-06-21 DIAGNOSIS — H33.021: ICD-10-CM

## 2023-06-21 PROCEDURE — 99213 OFFICE O/P EST LOW 20 MIN: CPT | Performed by: OPHTHALMOLOGY

## 2023-06-21 PROCEDURE — 92250 FUNDUS PHOTOGRAPHY W/I&R: CPT | Performed by: OPHTHALMOLOGY

## 2023-06-21 PROCEDURE — 92235 FLUORESCEIN ANGRPH MLTIFRAME: CPT | Performed by: OPHTHALMOLOGY

## 2023-06-21 ASSESSMENT — REFRACTION_AUTOREFRACTION
OS_AXIS: 100
OD_SPHERE: -2.75
OS_CYLINDER: -2.00
OD_AXIS: 168
OD_CYLINDER: -4.75
OS_SPHERE: -1.75

## 2023-06-21 ASSESSMENT — KERATOMETRY
OD_K1POWER_DIOPTERS: 42.00
OS_K2POWER_DIOPTERS: 44.50
OS_AXISANGLE_DEGREES: 013
OS_K1POWER_DIOPTERS: 43.50
OD_AXISANGLE_DEGREES: 007
OD_K2POWER_DIOPTERS: 44.50

## 2023-06-21 ASSESSMENT — SUPERFICIAL PUNCTATE KERATITIS (SPK)
OD_SPK: 1+
OS_SPK: 1+

## 2023-06-21 ASSESSMENT — VISUAL ACUITY
OS_BCVA: 20/200+1
OD_BCVA: 20/50+2

## 2023-06-21 ASSESSMENT — AXIALLENGTH_DERIVED
OD_AL: 25.8833
OS_AL: 24.5144

## 2023-06-21 ASSESSMENT — CONFRONTATIONAL VISUAL FIELD TEST (CVF)
OS_FINDINGS: FULL
OD_FINDINGS: FULL

## 2023-06-21 ASSESSMENT — SPHEQUIV_DERIVED
OD_SPHEQUIV: -5.125
OS_SPHEQUIV: -2.75

## 2023-07-12 ENCOUNTER — DOCTOR'S OFFICE (OUTPATIENT)
Dept: URBAN - METROPOLITAN AREA CLINIC 125 | Facility: CLINIC | Age: 78
Setting detail: OPHTHALMOLOGY
End: 2023-07-12
Payer: COMMERCIAL

## 2023-07-12 DIAGNOSIS — E11.3412: ICD-10-CM

## 2023-07-12 PROCEDURE — 67028 INJECTION EYE DRUG: CPT | Performed by: OPHTHALMOLOGY

## 2023-07-12 ASSESSMENT — REFRACTION_AUTOREFRACTION
OS_SPHERE: -1.75
OD_CYLINDER: -4.75
OD_AXIS: 168
OS_CYLINDER: -2.00
OD_SPHERE: -2.75
OS_AXIS: 100

## 2023-07-12 ASSESSMENT — KERATOMETRY
OD_K1POWER_DIOPTERS: 42.00
OD_AXISANGLE_DEGREES: 007
OS_K2POWER_DIOPTERS: 44.50
OS_K1POWER_DIOPTERS: 43.50
OD_K2POWER_DIOPTERS: 44.50
OS_AXISANGLE_DEGREES: 013

## 2023-07-12 ASSESSMENT — AXIALLENGTH_DERIVED
OD_AL: 25.8833
OS_AL: 24.5144

## 2023-07-12 ASSESSMENT — SPHEQUIV_DERIVED
OD_SPHEQUIV: -5.125
OS_SPHEQUIV: -2.75

## 2023-07-12 ASSESSMENT — VISUAL ACUITY
OD_BCVA: 20/30+2
OS_BCVA: 20/200+1

## 2023-07-19 ENCOUNTER — DOCTOR'S OFFICE (OUTPATIENT)
Dept: URBAN - METROPOLITAN AREA CLINIC 125 | Facility: CLINIC | Age: 78
Setting detail: OPHTHALMOLOGY
End: 2023-07-19
Payer: COMMERCIAL

## 2023-07-19 DIAGNOSIS — E11.3411: ICD-10-CM

## 2023-07-19 PROCEDURE — 67028 INJECTION EYE DRUG: CPT | Performed by: OPHTHALMOLOGY

## 2023-07-19 ASSESSMENT — REFRACTION_AUTOREFRACTION
OD_AXIS: 168
OS_CYLINDER: -2.00
OD_SPHERE: -2.75
OS_AXIS: 100
OD_CYLINDER: -4.75
OS_SPHERE: -1.75

## 2023-07-19 ASSESSMENT — KERATOMETRY
OD_AXISANGLE_DEGREES: 007
OS_K2POWER_DIOPTERS: 44.50
OS_K1POWER_DIOPTERS: 43.50
OD_K2POWER_DIOPTERS: 44.50
OD_K1POWER_DIOPTERS: 42.00
OS_AXISANGLE_DEGREES: 013

## 2023-07-19 ASSESSMENT — SPHEQUIV_DERIVED
OS_SPHEQUIV: -2.75
OD_SPHEQUIV: -5.125

## 2023-07-19 ASSESSMENT — AXIALLENGTH_DERIVED
OD_AL: 25.8833
OS_AL: 24.5144

## 2023-07-19 ASSESSMENT — VISUAL ACUITY
OS_BCVA: 20/200
OD_BCVA: 20/30+2

## 2023-07-28 ENCOUNTER — DOCTOR'S OFFICE (OUTPATIENT)
Dept: URBAN - METROPOLITAN AREA CLINIC 125 | Facility: CLINIC | Age: 78
Setting detail: OPHTHALMOLOGY
End: 2023-07-28
Payer: COMMERCIAL

## 2023-07-28 DIAGNOSIS — H43.11: ICD-10-CM

## 2023-07-28 DIAGNOSIS — H33.021: ICD-10-CM

## 2023-07-28 DIAGNOSIS — E11.3411: ICD-10-CM

## 2023-07-28 DIAGNOSIS — H33.21: ICD-10-CM

## 2023-07-28 DIAGNOSIS — H43.812: ICD-10-CM

## 2023-07-28 DIAGNOSIS — H43.811: ICD-10-CM

## 2023-07-28 DIAGNOSIS — E11.3412: ICD-10-CM

## 2023-07-28 PROCEDURE — 92134 CPTRZ OPH DX IMG PST SGM RTA: CPT | Performed by: OPHTHALMOLOGY

## 2023-07-28 PROCEDURE — 99213 OFFICE O/P EST LOW 20 MIN: CPT | Performed by: OPHTHALMOLOGY

## 2023-07-28 ASSESSMENT — REFRACTION_AUTOREFRACTION
OD_SPHERE: -2.75
OD_CYLINDER: -4.75
OS_AXIS: 100
OS_SPHERE: -1.75
OD_AXIS: 168
OS_CYLINDER: -2.00

## 2023-07-28 ASSESSMENT — KERATOMETRY
OD_AXISANGLE_DEGREES: 007
OS_K2POWER_DIOPTERS: 44.50
OD_K1POWER_DIOPTERS: 42.00
OS_K1POWER_DIOPTERS: 43.50
OD_K2POWER_DIOPTERS: 44.50
OS_AXISANGLE_DEGREES: 013

## 2023-07-28 ASSESSMENT — SUPERFICIAL PUNCTATE KERATITIS (SPK)
OS_SPK: 1+
OD_SPK: 1+

## 2023-07-28 ASSESSMENT — SPHEQUIV_DERIVED
OD_SPHEQUIV: -5.125
OS_SPHEQUIV: -2.75

## 2023-07-28 ASSESSMENT — AXIALLENGTH_DERIVED
OD_AL: 25.8833
OS_AL: 24.5144

## 2023-07-28 ASSESSMENT — VISUAL ACUITY
OD_BCVA: 20/20-2
OS_BCVA: 20/100-1

## 2023-07-28 ASSESSMENT — CONFRONTATIONAL VISUAL FIELD TEST (CVF)
OS_FINDINGS: FULL
OD_FINDINGS: FULL

## 2023-09-19 ENCOUNTER — DOCTOR'S OFFICE (OUTPATIENT)
Dept: URBAN - METROPOLITAN AREA CLINIC 125 | Facility: CLINIC | Age: 78
Setting detail: OPHTHALMOLOGY
End: 2023-09-19
Payer: COMMERCIAL

## 2023-09-19 DIAGNOSIS — E11.3412: ICD-10-CM

## 2023-09-19 PROCEDURE — 67028 INJECTION EYE DRUG: CPT | Performed by: OPHTHALMOLOGY

## 2023-09-19 ASSESSMENT — KERATOMETRY
OD_K1POWER_DIOPTERS: 42.00
OS_K2POWER_DIOPTERS: 44.50
OS_K1POWER_DIOPTERS: 43.50
OS_AXISANGLE_DEGREES: 013
OD_K2POWER_DIOPTERS: 44.50
OD_AXISANGLE_DEGREES: 007

## 2023-09-19 ASSESSMENT — REFRACTION_AUTOREFRACTION
OD_CYLINDER: -4.75
OD_AXIS: 168
OD_SPHERE: -2.75
OS_CYLINDER: -2.00
OS_AXIS: 100
OS_SPHERE: -1.75

## 2023-09-19 ASSESSMENT — VISUAL ACUITY
OD_BCVA: 20/25+2
OS_BCVA: 20/100-1

## 2023-09-19 ASSESSMENT — AXIALLENGTH_DERIVED
OD_AL: 25.8833
OS_AL: 24.5144

## 2023-09-19 ASSESSMENT — SPHEQUIV_DERIVED
OD_SPHEQUIV: -5.125
OS_SPHEQUIV: -2.75

## 2023-09-27 ENCOUNTER — DOCTOR'S OFFICE (OUTPATIENT)
Dept: URBAN - METROPOLITAN AREA CLINIC 125 | Facility: CLINIC | Age: 78
Setting detail: OPHTHALMOLOGY
End: 2023-09-27
Payer: COMMERCIAL

## 2023-09-27 DIAGNOSIS — E11.3411: ICD-10-CM

## 2023-09-27 PROCEDURE — 67028 INJECTION EYE DRUG: CPT | Performed by: OPHTHALMOLOGY

## 2023-09-27 ASSESSMENT — REFRACTION_AUTOREFRACTION
OD_SPHERE: -2.75
OS_SPHERE: -1.75
OD_CYLINDER: -4.75
OD_AXIS: 168
OS_CYLINDER: -2.00
OS_AXIS: 100

## 2023-09-27 ASSESSMENT — SPHEQUIV_DERIVED
OD_SPHEQUIV: -5.125
OS_SPHEQUIV: -2.75

## 2023-09-27 ASSESSMENT — KERATOMETRY
OS_K1POWER_DIOPTERS: 43.50
OD_AXISANGLE_DEGREES: 007
OD_K1POWER_DIOPTERS: 42.00
OD_K2POWER_DIOPTERS: 44.50
OS_K2POWER_DIOPTERS: 44.50
OS_AXISANGLE_DEGREES: 013

## 2023-09-27 ASSESSMENT — AXIALLENGTH_DERIVED
OS_AL: 24.5144
OD_AL: 25.8833

## 2023-09-27 ASSESSMENT — VISUAL ACUITY
OS_BCVA: 20/150
OD_BCVA: 20/25+2

## 2023-11-29 ENCOUNTER — DOCTOR'S OFFICE (OUTPATIENT)
Dept: URBAN - METROPOLITAN AREA CLINIC 125 | Facility: CLINIC | Age: 78
Setting detail: OPHTHALMOLOGY
End: 2023-11-29
Payer: COMMERCIAL

## 2023-11-29 DIAGNOSIS — E11.3411: ICD-10-CM

## 2023-11-29 DIAGNOSIS — E11.3412: ICD-10-CM

## 2023-11-29 DIAGNOSIS — H33.021: ICD-10-CM

## 2023-11-29 DIAGNOSIS — H33.21: ICD-10-CM

## 2023-11-29 PROCEDURE — 99214 OFFICE O/P EST MOD 30 MIN: CPT | Performed by: OPHTHALMOLOGY

## 2023-11-29 PROCEDURE — 92134 CPTRZ OPH DX IMG PST SGM RTA: CPT | Performed by: OPHTHALMOLOGY

## 2023-11-29 ASSESSMENT — CONFRONTATIONAL VISUAL FIELD TEST (CVF)
OD_FINDINGS: FULL
OS_FINDINGS: FULL

## 2023-11-29 ASSESSMENT — SUPERFICIAL PUNCTATE KERATITIS (SPK)
OD_SPK: 1+
OS_SPK: 1+

## 2023-11-29 ASSESSMENT — REFRACTION_AUTOREFRACTION
OD_SPHERE: -2.75
OS_CYLINDER: -2.00
OS_AXIS: 100
OD_CYLINDER: -4.75
OD_AXIS: 168
OS_SPHERE: -1.75

## 2023-11-29 ASSESSMENT — KERATOMETRY
OS_AXISANGLE_DEGREES: 013
OD_AXISANGLE_DEGREES: 007
OS_K2POWER_DIOPTERS: 44.50
OD_K2POWER_DIOPTERS: 44.50
OS_K1POWER_DIOPTERS: 43.50
OD_K1POWER_DIOPTERS: 42.00

## 2023-11-29 ASSESSMENT — SPHEQUIV_DERIVED
OD_SPHEQUIV: -5.125
OS_SPHEQUIV: -2.75

## 2023-11-29 ASSESSMENT — VISUAL ACUITY
OD_BCVA: 20/30+2
OS_BCVA: 20/200

## 2023-11-29 ASSESSMENT — AXIALLENGTH_DERIVED
OS_AL: 24.5144
OD_AL: 25.8833

## 2023-12-20 ENCOUNTER — DOCTOR'S OFFICE (OUTPATIENT)
Dept: URBAN - METROPOLITAN AREA CLINIC 125 | Facility: CLINIC | Age: 78
Setting detail: OPHTHALMOLOGY
End: 2023-12-20
Payer: COMMERCIAL

## 2023-12-20 DIAGNOSIS — E11.3411: ICD-10-CM

## 2023-12-20 PROCEDURE — 67028 INJECTION EYE DRUG: CPT | Mod: RT | Performed by: OPHTHALMOLOGY

## 2023-12-20 ASSESSMENT — SPHEQUIV_DERIVED
OS_SPHEQUIV: -2.75
OD_SPHEQUIV: -5.125

## 2023-12-20 ASSESSMENT — REFRACTION_AUTOREFRACTION
OD_SPHERE: -2.75
OS_SPHERE: -1.75
OD_CYLINDER: -4.75
OS_CYLINDER: -2.00
OS_AXIS: 100
OD_AXIS: 168

## 2023-12-22 ENCOUNTER — DOCTOR'S OFFICE (OUTPATIENT)
Dept: URBAN - METROPOLITAN AREA CLINIC 125 | Facility: CLINIC | Age: 78
Setting detail: OPHTHALMOLOGY
End: 2023-12-22
Payer: COMMERCIAL

## 2023-12-22 DIAGNOSIS — E11.3412: ICD-10-CM

## 2023-12-22 PROCEDURE — 67028 INJECTION EYE DRUG: CPT | Mod: LT | Performed by: OPHTHALMOLOGY

## 2023-12-22 ASSESSMENT — REFRACTION_AUTOREFRACTION
OS_SPHERE: -1.75
OD_AXIS: 168
OD_CYLINDER: -4.75
OD_SPHERE: -2.75
OS_AXIS: 100
OS_CYLINDER: -2.00

## 2023-12-22 ASSESSMENT — SPHEQUIV_DERIVED
OD_SPHEQUIV: -5.125
OS_SPHEQUIV: -2.75

## 2024-01-17 ENCOUNTER — DOCTOR'S OFFICE (OUTPATIENT)
Dept: URBAN - METROPOLITAN AREA CLINIC 125 | Facility: CLINIC | Age: 79
Setting detail: OPHTHALMOLOGY
End: 2024-01-17
Payer: COMMERCIAL

## 2024-01-17 DIAGNOSIS — H33.021: ICD-10-CM

## 2024-01-17 DIAGNOSIS — H43.11: ICD-10-CM

## 2024-01-17 DIAGNOSIS — E11.3411: ICD-10-CM

## 2024-01-17 DIAGNOSIS — E11.3412: ICD-10-CM

## 2024-01-17 PROCEDURE — 99214 OFFICE O/P EST MOD 30 MIN: CPT | Performed by: OPHTHALMOLOGY

## 2024-01-17 PROCEDURE — 92250 FUNDUS PHOTOGRAPHY W/I&R: CPT | Performed by: OPHTHALMOLOGY

## 2024-01-17 PROCEDURE — 92235 FLUORESCEIN ANGRPH MLTIFRAME: CPT | Performed by: OPHTHALMOLOGY

## 2024-01-17 ASSESSMENT — REFRACTION_AUTOREFRACTION
OD_AXIS: 168
OD_CYLINDER: -4.75
OS_SPHERE: -1.75
OD_SPHERE: -2.75
OS_AXIS: 100
OS_CYLINDER: -2.00

## 2024-01-17 ASSESSMENT — SPHEQUIV_DERIVED
OD_SPHEQUIV: -5.125
OS_SPHEQUIV: -2.75

## 2024-01-17 ASSESSMENT — SUPERFICIAL PUNCTATE KERATITIS (SPK)
OD_SPK: 1+
OS_SPK: 1+

## 2024-01-17 ASSESSMENT — CONFRONTATIONAL VISUAL FIELD TEST (CVF)
OS_FINDINGS: FULL
OD_FINDINGS: FULL

## 2024-01-26 ENCOUNTER — DOCTOR'S OFFICE (OUTPATIENT)
Dept: URBAN - METROPOLITAN AREA CLINIC 125 | Facility: CLINIC | Age: 79
Setting detail: OPHTHALMOLOGY
End: 2024-01-26
Payer: COMMERCIAL

## 2024-01-26 DIAGNOSIS — E11.3411: ICD-10-CM

## 2024-01-26 PROCEDURE — 67028 INJECTION EYE DRUG: CPT | Mod: RT | Performed by: OPHTHALMOLOGY

## 2024-01-26 ASSESSMENT — REFRACTION_AUTOREFRACTION
OD_SPHERE: -2.75
OS_AXIS: 100
OS_CYLINDER: -2.00
OS_SPHERE: -1.75
OD_AXIS: 168
OD_CYLINDER: -4.75

## 2024-01-26 ASSESSMENT — SPHEQUIV_DERIVED
OD_SPHEQUIV: -5.125
OS_SPHEQUIV: -2.75

## 2024-01-31 ENCOUNTER — AMBUL SURGICAL CARE (OUTPATIENT)
Dept: URBAN - METROPOLITAN AREA SURGERY 29 | Facility: SURGERY | Age: 79
Setting detail: OPHTHALMOLOGY
End: 2024-01-31
Payer: COMMERCIAL

## 2024-01-31 DIAGNOSIS — H33.021: ICD-10-CM

## 2024-01-31 DIAGNOSIS — H43.811: ICD-10-CM

## 2024-01-31 DIAGNOSIS — E11.3411: ICD-10-CM

## 2024-01-31 DIAGNOSIS — H43.11: ICD-10-CM

## 2024-01-31 DIAGNOSIS — H43.812: ICD-10-CM

## 2024-01-31 DIAGNOSIS — E11.3412: ICD-10-CM

## 2024-01-31 PROCEDURE — 92134 CPTRZ OPH DX IMG PST SGM RTA: CPT | Performed by: OPHTHALMOLOGY

## 2024-01-31 PROCEDURE — 99214 OFFICE O/P EST MOD 30 MIN: CPT | Performed by: OPHTHALMOLOGY

## 2024-01-31 ASSESSMENT — CONFRONTATIONAL VISUAL FIELD TEST (CVF)
OS_FINDINGS: FULL
OD_FINDINGS: FULL

## 2024-02-07 ENCOUNTER — DOCTOR'S OFFICE (OUTPATIENT)
Dept: URBAN - METROPOLITAN AREA CLINIC 125 | Facility: CLINIC | Age: 79
Setting detail: OPHTHALMOLOGY
End: 2024-02-07
Payer: COMMERCIAL

## 2024-02-07 DIAGNOSIS — H43.11: ICD-10-CM

## 2024-02-07 DIAGNOSIS — H33.021: ICD-10-CM

## 2024-02-07 DIAGNOSIS — E11.3411: ICD-10-CM

## 2024-02-07 DIAGNOSIS — E11.3412: ICD-10-CM

## 2024-02-07 PROCEDURE — 99214 OFFICE O/P EST MOD 30 MIN: CPT | Performed by: OPHTHALMOLOGY

## 2024-02-07 PROCEDURE — 92134 CPTRZ OPH DX IMG PST SGM RTA: CPT | Performed by: OPHTHALMOLOGY

## 2024-02-07 ASSESSMENT — CONFRONTATIONAL VISUAL FIELD TEST (CVF): OD_FINDINGS: FULL

## 2024-02-14 ENCOUNTER — DOCTOR'S OFFICE (OUTPATIENT)
Dept: URBAN - METROPOLITAN AREA CLINIC 125 | Facility: CLINIC | Age: 79
Setting detail: OPHTHALMOLOGY
End: 2024-02-14
Payer: COMMERCIAL

## 2024-02-14 VITALS — HEIGHT: 55 IN | HEIGHT: 55 IN

## 2024-02-14 DIAGNOSIS — E11.3412: ICD-10-CM

## 2024-02-14 PROCEDURE — 67028 INJECTION EYE DRUG: CPT | Mod: LT | Performed by: OPHTHALMOLOGY

## 2024-03-13 ENCOUNTER — DOCTOR'S OFFICE (OUTPATIENT)
Dept: URBAN - METROPOLITAN AREA CLINIC 125 | Facility: CLINIC | Age: 79
Setting detail: OPHTHALMOLOGY
End: 2024-03-13
Payer: MEDICARE

## 2024-03-13 DIAGNOSIS — E11.3411: ICD-10-CM

## 2024-03-13 DIAGNOSIS — H33.021: ICD-10-CM

## 2024-03-13 DIAGNOSIS — E11.3412: ICD-10-CM

## 2024-03-13 DIAGNOSIS — H43.11: ICD-10-CM

## 2024-03-13 DIAGNOSIS — H04.122: ICD-10-CM

## 2024-03-13 DIAGNOSIS — H04.121: ICD-10-CM

## 2024-03-13 PROCEDURE — 99214 OFFICE O/P EST MOD 30 MIN: CPT | Performed by: OPHTHALMOLOGY

## 2024-03-13 PROCEDURE — 83861 MICROFLUID ANALY TEARS: CPT | Mod: QW,RT | Performed by: OPHTHALMOLOGY

## 2024-03-13 PROCEDURE — 92134 CPTRZ OPH DX IMG PST SGM RTA: CPT | Performed by: OPHTHALMOLOGY

## 2024-03-13 PROCEDURE — 83861 MICROFLUID ANALY TEARS: CPT | Mod: QW,LT | Performed by: OPHTHALMOLOGY

## 2024-04-10 ENCOUNTER — DOCTOR'S OFFICE (OUTPATIENT)
Dept: URBAN - METROPOLITAN AREA CLINIC 125 | Facility: CLINIC | Age: 79
Setting detail: OPHTHALMOLOGY
End: 2024-04-10
Payer: MEDICARE

## 2024-04-10 DIAGNOSIS — E11.3412: ICD-10-CM

## 2024-04-10 PROCEDURE — 67028 INJECTION EYE DRUG: CPT | Mod: LT | Performed by: OPHTHALMOLOGY

## 2024-04-12 ENCOUNTER — DOCTOR'S OFFICE (OUTPATIENT)
Dept: URBAN - METROPOLITAN AREA CLINIC 125 | Facility: CLINIC | Age: 79
Setting detail: OPHTHALMOLOGY
End: 2024-04-12
Payer: MEDICARE

## 2024-04-12 DIAGNOSIS — H33.021: ICD-10-CM

## 2024-04-12 DIAGNOSIS — E11.3411: ICD-10-CM

## 2024-04-12 DIAGNOSIS — H04.121: ICD-10-CM

## 2024-04-12 DIAGNOSIS — E11.3412: ICD-10-CM

## 2024-04-12 DIAGNOSIS — H43.11: ICD-10-CM

## 2024-04-12 PROCEDURE — 67028 INJECTION EYE DRUG: CPT | Mod: RT | Performed by: OPHTHALMOLOGY

## 2024-04-12 PROCEDURE — 92134 CPTRZ OPH DX IMG PST SGM RTA: CPT | Performed by: OPHTHALMOLOGY

## 2024-04-12 PROCEDURE — 99214 OFFICE O/P EST MOD 30 MIN: CPT | Mod: 25 | Performed by: OPHTHALMOLOGY

## 2024-04-16 ENCOUNTER — AMBUL SURGICAL CARE (OUTPATIENT)
Dept: URBAN - METROPOLITAN AREA SURGERY 29 | Facility: SURGERY | Age: 79
Setting detail: OPHTHALMOLOGY
End: 2024-04-16
Payer: MEDICARE

## 2024-04-16 DIAGNOSIS — E11.3511: ICD-10-CM

## 2024-04-16 PROBLEM — H53.121 SUBJECTIVE VISUAL DISTURBANCE; RIGHT EYE: Status: ACTIVE | Noted: 2024-04-12

## 2024-04-16 PROCEDURE — G8918 PT W/O PREOP ORDER IV AB PRO: HCPCS | Mod: RT | Performed by: CLINIC/CENTER

## 2024-04-16 PROCEDURE — 67228 TREATMENT X10SV RETINOPATHY: CPT | Mod: RT | Performed by: CLINIC/CENTER

## 2024-04-16 PROCEDURE — G8907 PT DOC NO EVENTS ON DISCHARG: HCPCS | Performed by: OPHTHALMOLOGY

## 2024-04-16 PROCEDURE — G8907 PT DOC NO EVENTS ON DISCHARG: HCPCS | Mod: RT | Performed by: CLINIC/CENTER

## 2024-04-16 PROCEDURE — G8918 PT W/O PREOP ORDER IV AB PRO: HCPCS | Performed by: OPHTHALMOLOGY

## 2024-04-16 PROCEDURE — 67228 TREATMENT X10SV RETINOPATHY: CPT | Mod: RT | Performed by: OPHTHALMOLOGY

## 2024-05-15 ENCOUNTER — DOCTOR'S OFFICE (OUTPATIENT)
Dept: URBAN - METROPOLITAN AREA CLINIC 125 | Facility: CLINIC | Age: 79
Setting detail: OPHTHALMOLOGY
End: 2024-05-15
Payer: MEDICARE

## 2024-05-15 VITALS — HEIGHT: 55 IN

## 2024-05-15 DIAGNOSIS — H33.021: ICD-10-CM

## 2024-05-15 DIAGNOSIS — E11.3411: ICD-10-CM

## 2024-05-15 DIAGNOSIS — E11.3412: ICD-10-CM

## 2024-05-15 DIAGNOSIS — H43.11: ICD-10-CM

## 2024-05-15 PROCEDURE — 92134 CPTRZ OPH DX IMG PST SGM RTA: CPT | Performed by: OPHTHALMOLOGY

## 2024-05-15 PROCEDURE — 99213 OFFICE O/P EST LOW 20 MIN: CPT | Performed by: OPHTHALMOLOGY

## 2024-05-15 ASSESSMENT — CONFRONTATIONAL VISUAL FIELD TEST (CVF)
OS_FINDINGS: FULL
OD_FINDINGS: FULL

## 2024-05-21 ENCOUNTER — DOCTOR'S OFFICE (OUTPATIENT)
Dept: URBAN - METROPOLITAN AREA CLINIC 125 | Facility: CLINIC | Age: 79
Setting detail: OPHTHALMOLOGY
End: 2024-05-21
Payer: MEDICARE

## 2024-05-21 DIAGNOSIS — E11.3411: ICD-10-CM

## 2024-05-21 DIAGNOSIS — H43.11: ICD-10-CM

## 2024-05-21 PROCEDURE — 92235 FLUORESCEIN ANGRPH MLTIFRAME: CPT | Performed by: OPHTHALMOLOGY

## 2024-05-21 PROCEDURE — 92250 FUNDUS PHOTOGRAPHY W/I&R: CPT | Performed by: OPHTHALMOLOGY

## 2024-05-21 PROCEDURE — 67028 INJECTION EYE DRUG: CPT | Mod: RT | Performed by: OPHTHALMOLOGY

## 2024-05-21 ASSESSMENT — CONFRONTATIONAL VISUAL FIELD TEST (CVF)
OS_FINDINGS: FULL
OD_FINDINGS: FULL

## 2024-05-22 ENCOUNTER — DOCTOR'S OFFICE (OUTPATIENT)
Dept: URBAN - METROPOLITAN AREA CLINIC 125 | Facility: CLINIC | Age: 79
Setting detail: OPHTHALMOLOGY
End: 2024-05-22
Payer: MEDICARE

## 2024-05-22 DIAGNOSIS — E11.3412: ICD-10-CM

## 2024-05-22 PROCEDURE — 67028 INJECTION EYE DRUG: CPT | Mod: LT | Performed by: OPHTHALMOLOGY

## 2024-06-12 ENCOUNTER — DOCTOR'S OFFICE (OUTPATIENT)
Dept: URBAN - METROPOLITAN AREA CLINIC 125 | Facility: CLINIC | Age: 79
Setting detail: OPHTHALMOLOGY
End: 2024-06-12
Payer: MEDICARE

## 2024-06-12 DIAGNOSIS — E11.3412: ICD-10-CM

## 2024-06-12 DIAGNOSIS — H33.021: ICD-10-CM

## 2024-06-12 DIAGNOSIS — E11.3411: ICD-10-CM

## 2024-06-12 DIAGNOSIS — H43.11: ICD-10-CM

## 2024-06-12 PROCEDURE — 92134 CPTRZ OPH DX IMG PST SGM RTA: CPT | Performed by: OPHTHALMOLOGY

## 2024-06-12 PROCEDURE — 99213 OFFICE O/P EST LOW 20 MIN: CPT | Performed by: OPHTHALMOLOGY

## 2024-06-18 ENCOUNTER — DOCTOR'S OFFICE (OUTPATIENT)
Dept: URBAN - METROPOLITAN AREA CLINIC 125 | Facility: CLINIC | Age: 79
Setting detail: OPHTHALMOLOGY
End: 2024-06-18
Payer: MEDICARE

## 2024-06-18 DIAGNOSIS — E11.3411: ICD-10-CM

## 2024-06-18 PROCEDURE — 67028 INJECTION EYE DRUG: CPT | Mod: RT | Performed by: OPHTHALMOLOGY

## 2024-06-19 ENCOUNTER — DOCTOR'S OFFICE (OUTPATIENT)
Dept: URBAN - METROPOLITAN AREA CLINIC 125 | Facility: CLINIC | Age: 79
Setting detail: OPHTHALMOLOGY
End: 2024-06-19
Payer: MEDICARE

## 2024-06-19 DIAGNOSIS — E11.3412: ICD-10-CM

## 2024-06-19 PROCEDURE — 67028 INJECTION EYE DRUG: CPT | Mod: LT | Performed by: OPHTHALMOLOGY

## 2024-07-10 ENCOUNTER — DOCTOR'S OFFICE (OUTPATIENT)
Dept: URBAN - METROPOLITAN AREA CLINIC 125 | Facility: CLINIC | Age: 79
Setting detail: OPHTHALMOLOGY
End: 2024-07-10
Payer: MEDICARE

## 2024-07-10 DIAGNOSIS — H33.021: ICD-10-CM

## 2024-07-10 DIAGNOSIS — E11.3411: ICD-10-CM

## 2024-07-10 DIAGNOSIS — H43.11: ICD-10-CM

## 2024-07-10 DIAGNOSIS — E11.3412: ICD-10-CM

## 2024-07-10 PROCEDURE — 99213 OFFICE O/P EST LOW 20 MIN: CPT | Performed by: OPHTHALMOLOGY

## 2024-07-10 PROCEDURE — 92134 CPTRZ OPH DX IMG PST SGM RTA: CPT | Performed by: OPHTHALMOLOGY

## 2024-07-10 ASSESSMENT — CONFRONTATIONAL VISUAL FIELD TEST (CVF)
OD_FINDINGS: FULL
OS_FINDINGS: FULL

## 2024-07-16 ENCOUNTER — DOCTOR'S OFFICE (OUTPATIENT)
Dept: URBAN - METROPOLITAN AREA CLINIC 125 | Facility: CLINIC | Age: 79
Setting detail: OPHTHALMOLOGY
End: 2024-07-16
Payer: MEDICARE

## 2024-07-16 DIAGNOSIS — E11.3411: ICD-10-CM

## 2024-07-16 PROCEDURE — 67028 INJECTION EYE DRUG: CPT | Mod: RT | Performed by: OPHTHALMOLOGY

## 2024-07-24 ENCOUNTER — DOCTOR'S OFFICE (OUTPATIENT)
Dept: URBAN - METROPOLITAN AREA CLINIC 125 | Facility: CLINIC | Age: 79
Setting detail: OPHTHALMOLOGY
End: 2024-07-24
Payer: MEDICARE

## 2024-07-24 DIAGNOSIS — E11.3412: ICD-10-CM

## 2024-07-24 PROCEDURE — 67028 INJECTION EYE DRUG: CPT | Mod: LT | Performed by: OPHTHALMOLOGY

## 2024-08-07 ENCOUNTER — DOCTOR'S OFFICE (OUTPATIENT)
Dept: URBAN - METROPOLITAN AREA CLINIC 125 | Facility: CLINIC | Age: 79
Setting detail: OPHTHALMOLOGY
End: 2024-08-07
Payer: MEDICARE

## 2024-08-07 DIAGNOSIS — H33.021: ICD-10-CM

## 2024-08-07 DIAGNOSIS — E11.3412: ICD-10-CM

## 2024-08-07 DIAGNOSIS — H43.11: ICD-10-CM

## 2024-08-07 DIAGNOSIS — E11.3411: ICD-10-CM

## 2024-08-07 PROCEDURE — 92134 CPTRZ OPH DX IMG PST SGM RTA: CPT | Performed by: OPHTHALMOLOGY

## 2024-08-07 PROCEDURE — 99214 OFFICE O/P EST MOD 30 MIN: CPT | Performed by: OPHTHALMOLOGY

## 2024-08-07 ASSESSMENT — CONFRONTATIONAL VISUAL FIELD TEST (CVF)
OS_FINDINGS: FULL
OD_FINDINGS: FULL

## 2024-08-14 ENCOUNTER — DOCTOR'S OFFICE (OUTPATIENT)
Dept: URBAN - METROPOLITAN AREA CLINIC 125 | Facility: CLINIC | Age: 79
Setting detail: OPHTHALMOLOGY
End: 2024-08-14
Payer: MEDICARE

## 2024-08-14 DIAGNOSIS — E11.3411: ICD-10-CM

## 2024-08-14 PROCEDURE — 67028 INJECTION EYE DRUG: CPT | Mod: RT | Performed by: OPHTHALMOLOGY

## 2024-08-21 ENCOUNTER — DOCTOR'S OFFICE (OUTPATIENT)
Dept: URBAN - METROPOLITAN AREA CLINIC 125 | Facility: CLINIC | Age: 79
Setting detail: OPHTHALMOLOGY
End: 2024-08-21
Payer: MEDICARE

## 2024-08-21 DIAGNOSIS — E11.3412: ICD-10-CM

## 2024-08-21 PROCEDURE — 67028 INJECTION EYE DRUG: CPT | Mod: LT | Performed by: OPHTHALMOLOGY

## 2024-08-28 ENCOUNTER — DOCTOR'S OFFICE (OUTPATIENT)
Dept: URBAN - METROPOLITAN AREA CLINIC 125 | Facility: CLINIC | Age: 79
Setting detail: OPHTHALMOLOGY
End: 2024-08-28
Payer: MEDICARE

## 2024-08-28 DIAGNOSIS — H33.021: ICD-10-CM

## 2024-08-28 DIAGNOSIS — H43.812: ICD-10-CM

## 2024-08-28 DIAGNOSIS — H43.811: ICD-10-CM

## 2024-08-28 DIAGNOSIS — E11.3411: ICD-10-CM

## 2024-08-28 DIAGNOSIS — H43.11: ICD-10-CM

## 2024-08-28 DIAGNOSIS — E11.3412: ICD-10-CM

## 2024-08-28 PROCEDURE — 92250 FUNDUS PHOTOGRAPHY W/I&R: CPT | Performed by: OPHTHALMOLOGY

## 2024-08-28 PROCEDURE — 99214 OFFICE O/P EST MOD 30 MIN: CPT | Performed by: OPHTHALMOLOGY

## 2024-08-28 PROCEDURE — 92235 FLUORESCEIN ANGRPH MLTIFRAME: CPT | Performed by: OPHTHALMOLOGY

## 2024-08-28 ASSESSMENT — CONFRONTATIONAL VISUAL FIELD TEST (CVF)
OS_FINDINGS: FULL
OD_FINDINGS: FULL

## 2024-09-11 ENCOUNTER — DOCTOR'S OFFICE (OUTPATIENT)
Dept: URBAN - METROPOLITAN AREA CLINIC 125 | Facility: CLINIC | Age: 79
Setting detail: OPHTHALMOLOGY
End: 2024-09-11
Payer: MEDICARE

## 2024-09-11 VITALS — HEIGHT: 55 IN

## 2024-09-11 DIAGNOSIS — E11.3411: ICD-10-CM

## 2024-09-11 PROCEDURE — 67028 INJECTION EYE DRUG: CPT | Mod: RT | Performed by: OPHTHALMOLOGY

## 2024-09-11 ASSESSMENT — KERATOMETRY
OS_AXISANGLE_DEGREES: 013
OS_K2POWER_DIOPTERS: 44.50
OD_K1POWER_DIOPTERS: 42.00
OD_AXISANGLE_DEGREES: 007
OS_K1POWER_DIOPTERS: 43.50
OD_K2POWER_DIOPTERS: 44.50

## 2024-09-11 ASSESSMENT — REFRACTION_AUTOREFRACTION
OD_AXIS: 168
OS_SPHERE: -1.75
OS_AXIS: 100
OD_CYLINDER: -4.75
OD_SPHERE: -2.75
OS_CYLINDER: -2.00

## 2024-09-11 ASSESSMENT — VISUAL ACUITY
OD_BCVA: 20/25+1
OS_BCVA: 20/150-1

## 2024-09-18 ENCOUNTER — DOCTOR'S OFFICE (OUTPATIENT)
Dept: URBAN - METROPOLITAN AREA CLINIC 125 | Facility: CLINIC | Age: 79
Setting detail: OPHTHALMOLOGY
End: 2024-09-18
Payer: MEDICARE

## 2024-09-18 VITALS — HEIGHT: 55 IN

## 2024-09-18 DIAGNOSIS — E11.3412: ICD-10-CM

## 2024-09-18 PROCEDURE — 67028 INJECTION EYE DRUG: CPT | Mod: LT | Performed by: OPHTHALMOLOGY

## 2024-09-18 ASSESSMENT — REFRACTION_AUTOREFRACTION
OS_CYLINDER: -2.00
OD_SPHERE: -2.75
OS_SPHERE: -1.75
OD_CYLINDER: -4.75
OD_AXIS: 168
OS_AXIS: 100

## 2024-09-18 ASSESSMENT — KERATOMETRY
OD_AXISANGLE_DEGREES: 007
OS_AXISANGLE_DEGREES: 013
OD_K1POWER_DIOPTERS: 42.00
OS_K2POWER_DIOPTERS: 44.50
OS_K1POWER_DIOPTERS: 43.50
OD_K2POWER_DIOPTERS: 44.50

## 2024-09-18 ASSESSMENT — VISUAL ACUITY
OD_BCVA: 20/25-2
OS_BCVA: 20/150-1

## 2024-10-02 ENCOUNTER — DOCTOR'S OFFICE (OUTPATIENT)
Dept: URBAN - METROPOLITAN AREA CLINIC 125 | Facility: CLINIC | Age: 79
Setting detail: OPHTHALMOLOGY
End: 2024-10-02
Payer: MEDICARE

## 2024-10-02 VITALS — HEIGHT: 55 IN

## 2024-10-02 DIAGNOSIS — E11.3412: ICD-10-CM

## 2024-10-02 DIAGNOSIS — H33.021: ICD-10-CM

## 2024-10-02 DIAGNOSIS — H43.11: ICD-10-CM

## 2024-10-02 DIAGNOSIS — E11.3411: ICD-10-CM

## 2024-10-02 PROCEDURE — 92201 OPSCPY EXTND RTA DRAW UNI/BI: CPT | Performed by: OPHTHALMOLOGY

## 2024-10-02 PROCEDURE — 92134 CPTRZ OPH DX IMG PST SGM RTA: CPT | Performed by: OPHTHALMOLOGY

## 2024-10-02 PROCEDURE — 99214 OFFICE O/P EST MOD 30 MIN: CPT | Performed by: OPHTHALMOLOGY

## 2024-10-02 ASSESSMENT — SUPERFICIAL PUNCTATE KERATITIS (SPK)
OS_SPK: 1+
OD_SPK: 1+

## 2024-10-02 ASSESSMENT — REFRACTION_AUTOREFRACTION
OD_CYLINDER: -4.75
OD_AXIS: 168
OS_CYLINDER: -2.00
OS_SPHERE: -1.75
OD_SPHERE: -2.75
OS_AXIS: 100

## 2024-10-02 ASSESSMENT — KERATOMETRY
OS_AXISANGLE_DEGREES: 013
OD_K1POWER_DIOPTERS: 42.00
OD_AXISANGLE_DEGREES: 007
OS_K2POWER_DIOPTERS: 44.50
OS_K1POWER_DIOPTERS: 43.50
OD_K2POWER_DIOPTERS: 44.50

## 2024-10-02 ASSESSMENT — VISUAL ACUITY
OD_BCVA: 20/25-2
OS_BCVA: 20/150-1

## 2024-10-25 ENCOUNTER — DOCTOR'S OFFICE (OUTPATIENT)
Dept: URBAN - METROPOLITAN AREA CLINIC 125 | Facility: CLINIC | Age: 79
Setting detail: OPHTHALMOLOGY
End: 2024-10-25
Payer: MEDICARE

## 2024-10-25 DIAGNOSIS — E11.3411: ICD-10-CM

## 2024-10-25 PROCEDURE — 67028 INJECTION EYE DRUG: CPT | Mod: RT | Performed by: OPHTHALMOLOGY

## 2024-10-25 ASSESSMENT — VISUAL ACUITY
OS_BCVA: 20/200-1
OD_BCVA: 20/25-2

## 2024-10-25 ASSESSMENT — REFRACTION_AUTOREFRACTION
OS_CYLINDER: -2.00
OD_AXIS: 168
OS_SPHERE: -1.75
OD_CYLINDER: -4.75
OS_AXIS: 100
OD_SPHERE: -2.75

## 2024-10-25 ASSESSMENT — KERATOMETRY
OD_K2POWER_DIOPTERS: 44.50
OS_K2POWER_DIOPTERS: 44.50
OS_AXISANGLE_DEGREES: 013
OS_K1POWER_DIOPTERS: 43.50
OD_K1POWER_DIOPTERS: 42.00
OD_AXISANGLE_DEGREES: 007

## 2024-10-30 ENCOUNTER — DOCTOR'S OFFICE (OUTPATIENT)
Dept: URBAN - METROPOLITAN AREA CLINIC 125 | Facility: CLINIC | Age: 79
Setting detail: OPHTHALMOLOGY
End: 2024-10-30
Payer: MEDICARE

## 2024-10-30 DIAGNOSIS — E11.3412: ICD-10-CM

## 2024-10-30 PROCEDURE — 67028 INJECTION EYE DRUG: CPT | Mod: LT | Performed by: OPHTHALMOLOGY

## 2024-10-30 ASSESSMENT — KERATOMETRY
OD_K2POWER_DIOPTERS: 44.50
OS_AXISANGLE_DEGREES: 013
OS_K1POWER_DIOPTERS: 43.50
OS_K2POWER_DIOPTERS: 44.50
OD_AXISANGLE_DEGREES: 007
OD_K1POWER_DIOPTERS: 42.00

## 2024-10-30 ASSESSMENT — VISUAL ACUITY
OD_BCVA: 20/30-2
OS_BCVA: 20/200-1

## 2024-10-30 ASSESSMENT — REFRACTION_AUTOREFRACTION
OS_CYLINDER: -2.00
OS_SPHERE: -1.75
OS_AXIS: 100
OD_CYLINDER: -4.75
OD_SPHERE: -2.75
OD_AXIS: 168

## 2024-12-12 ENCOUNTER — EVALUATION (OUTPATIENT)
Age: 79
End: 2024-12-12
Payer: MEDICARE

## 2024-12-12 DIAGNOSIS — G89.29 CHRONIC LEFT SHOULDER PAIN: Primary | ICD-10-CM

## 2024-12-12 DIAGNOSIS — M25.512 CHRONIC LEFT SHOULDER PAIN: Primary | ICD-10-CM

## 2024-12-12 PROCEDURE — 97163 PT EVAL HIGH COMPLEX 45 MIN: CPT | Performed by: PHYSICAL THERAPIST

## 2024-12-12 PROCEDURE — 97110 THERAPEUTIC EXERCISES: CPT | Performed by: PHYSICAL THERAPIST

## 2024-12-12 NOTE — LETTER
2024    No Recipients    Patient: Joyce Seip   YOB: 1945   Date of Visit: 2024     Encounter Diagnosis     ICD-10-CM    1. Chronic left shoulder pain  M25.512     G89.29           Dear Dr. Orosco Recipients:    Thank you for your recent referral of Joyce Seip. Please review the attached evaluation summary from Lucia's recent visit.     Please verify that you agree with the plan of care by signing the attached order.     If you have any questions or concerns, please do not hesitate to call.     I sincerely appreciate the opportunity to share in the care of one of your patients and hope to have another opportunity to work with you in the near future.       Sincerely,    Aubrey Love, PT      Referring Provider:      I certify that I have read the below Plan of Care and certify the need for these services furnished under this plan of treatment while under my care.                    No Recipients          PT Evaluation     Today's date: 2024  Patient name: Joyce Seip  : 1945  MRN: [id number]  Referring provider: Petr Peck MD  Dx:   Encounter Diagnosis     ICD-10-CM    1. Chronic left shoulder pain  M25.512     G89.29           Start Time: 0930  Stop Time: 1030  Total time in clinic (min): 60 minutes    Assessment  Impairments: abnormal coordination, abnormal muscle firing, abnormal or restricted ROM, abnormal movement, activity intolerance, impaired balance, impaired physical strength, lacks appropriate home exercise program, pain with function, poor posture  and poor body mechanics  Functional limitations: transfers, OH acivity, IADLs,  Symptom irritability: moderate    Assessment details: Joyce Seip is a 79 y.o. adult presenting with chronic L shoulder pain. Primary impairments include decreased ROM, pain, weakness. Will benefit from skilled PT interventions for improved IADLs, transfers, Shoulder height activity. HEP was provided and reviewed. Patient is able  to complete HEP with good technique and appropriate pain response. Patient expressed understanding of appropriate dosage and frequency of HEP. No additional referral necessary.     Understanding of Dx/Px/POC: good     Prognosis: good    Goals    Short Term Goals:  In 4 weeks, the patient will:  1. SH elevation to 90  2. SH MMT to 3/5  3. Supervision with HEP for self care    Long Term Goals:  In 8 weeks, the patient will:  1. SH MMT to 4/5  2. FOTO to greater than predicted value  3. Independent with HEP for selfcare    Plan  Patient would benefit from: skilled physical therapy    Planned therapy interventions: joint mobilization, manual therapy, massage, Montero taping, neuromuscular re-education, patient education, postural training, self care, strengthening, stretching, therapeutic activities, therapeutic exercise, therapeutic training, graded exercise, graded activity, home exercise program, flexibility, functional ROM exercises, balance and activity modification    Frequency: 2x week  Duration in weeks: 8  Treatment plan discussed with: patient        Subjective  Pain Location: L shoulder pain, chronic, progressive over the last couple month,   Pain Intensity: 5-6/10  LAKISHA: chronic  Provoked by: night time, AROM   Eased Positions: avoidance,   Living Situation:denies  Constitutional S/S: denies   Goals: improved shoulder function,     Objective  Vitals: HR 77 SpO2 99% room air, /86  Red Flags: None    Standing         Head Position x Protracted  Neutral  Retracted   Scapular Position x Protracted  Neutral  Retracted   Thoracic Spine x Inc Kyphosis  Neutral     Lumbar Spine x Inc Lordosis  Neutral  Dec Lordosis     Strength and ROM evaluated B from a regional biomechanical perspective and values relevant to this episode recorded in table below    ROM: Goniometric measurement revealed the following findings.  Shoulder ROM Right Left   Flexion 85 40p   Abduction 80 50p   ER 70 10p   IR belly belly           Strength: MMT revealed the following findings.  Joint Motion Right Left   Sh. Flexion 3+/5 2/5   Sh. Abduction 3+/5 2/5   Sh. ER 3+/5 2/5   Sh. IR 3+/5 2/5   Shoulder extension 3+/5 2/5   Shoulder horizontal ABD 3+/5 2/5          Additional Assessments:  Palpation: TTP LHB, generalized hyperalgesia, significant crepitus with PROM  Joint mobility: decreased all planes    Shoulder Specific Special Tests: Inconclusive due to hyper irritability                                                                                                                                                                     Precautions: FALL RISK L BKA (14yo), DM, CHF, MI (2 years ago), PVD, Depression, Charcot Zoie Tooth, CKD (dialysis 3d/wk), Retinopathy    Visit/Unit Tracking  AUTH Status:  Date 12/12     MC BOMN RE every 10 v Used 1      Remaining          Pertinent Findings:      POC End Date: 2/12/24                                                                                          Test / Measure  12/12   FOTO (Predicted 53) 36   SH AROM flexion 85   SH AROM ABD 80         Manuals 12/12   PROM                Neuro Re-Ed                                Ther Ex    HEP review 8'   Cane AAROM                            Ther Activity            Gait Training            Modalities

## 2024-12-17 ENCOUNTER — OFFICE VISIT (OUTPATIENT)
Age: 79
End: 2024-12-17
Payer: MEDICARE

## 2024-12-18 ENCOUNTER — DOCTOR'S OFFICE (OUTPATIENT)
Dept: URBAN - METROPOLITAN AREA CLINIC 125 | Facility: CLINIC | Age: 79
Setting detail: OPHTHALMOLOGY
End: 2024-12-18
Payer: MEDICARE

## 2024-12-18 DIAGNOSIS — E11.3411: ICD-10-CM

## 2024-12-18 PROCEDURE — 67028 INJECTION EYE DRUG: CPT | Mod: RT | Performed by: OPHTHALMOLOGY

## 2024-12-18 ASSESSMENT — KERATOMETRY
OD_K2POWER_DIOPTERS: 44.50
OD_K1POWER_DIOPTERS: 42.00
OS_K2POWER_DIOPTERS: 44.50
OS_AXISANGLE_DEGREES: 013
OD_AXISANGLE_DEGREES: 007
OS_K1POWER_DIOPTERS: 43.50

## 2024-12-18 ASSESSMENT — REFRACTION_AUTOREFRACTION
OS_AXIS: 100
OD_AXIS: 168
OD_SPHERE: -2.75
OS_SPHERE: -1.75
OS_CYLINDER: -2.00
OD_CYLINDER: -4.75

## 2024-12-18 ASSESSMENT — VISUAL ACUITY
OD_BCVA: 20/30-2
OS_BCVA: 20/200-1

## 2024-12-19 ENCOUNTER — APPOINTMENT (OUTPATIENT)
Age: 79
End: 2024-12-19
Payer: MEDICARE

## 2025-03-12 ENCOUNTER — DOCTOR'S OFFICE (OUTPATIENT)
Dept: URBAN - METROPOLITAN AREA CLINIC 125 | Facility: CLINIC | Age: 80
Setting detail: OPHTHALMOLOGY
End: 2025-03-12
Payer: MEDICARE

## 2025-03-12 DIAGNOSIS — H33.021: ICD-10-CM

## 2025-03-12 DIAGNOSIS — H43.812: ICD-10-CM

## 2025-03-12 DIAGNOSIS — E11.3412: ICD-10-CM

## 2025-03-12 DIAGNOSIS — H43.11: ICD-10-CM

## 2025-03-12 DIAGNOSIS — E11.3411: ICD-10-CM

## 2025-03-12 PROCEDURE — 92201 OPSCPY EXTND RTA DRAW UNI/BI: CPT | Performed by: OPHTHALMOLOGY

## 2025-03-12 PROCEDURE — 99213 OFFICE O/P EST LOW 20 MIN: CPT | Performed by: OPHTHALMOLOGY

## 2025-03-12 PROCEDURE — 92134 CPTRZ OPH DX IMG PST SGM RTA: CPT | Performed by: OPHTHALMOLOGY

## 2025-03-12 ASSESSMENT — SUPERFICIAL PUNCTATE KERATITIS (SPK)
OS_SPK: 1+
OD_SPK: 1+

## 2025-03-12 ASSESSMENT — REFRACTION_AUTOREFRACTION
OD_CYLINDER: -4.75
OD_AXIS: 168
OD_SPHERE: -2.75
OS_CYLINDER: -2.00
OS_AXIS: 100
OS_SPHERE: -1.75

## 2025-03-12 ASSESSMENT — VISUAL ACUITY
OD_BCVA: 20/30-1
OS_BCVA: CF@5FT

## 2025-03-12 ASSESSMENT — KERATOMETRY
OS_K2POWER_DIOPTERS: 44.50
OD_K1POWER_DIOPTERS: 42.00
OD_AXISANGLE_DEGREES: 007
OS_K1POWER_DIOPTERS: 43.50
OS_AXISANGLE_DEGREES: 013
OD_K2POWER_DIOPTERS: 44.50

## 2025-03-12 ASSESSMENT — CONFRONTATIONAL VISUAL FIELD TEST (CVF)
OS_FINDINGS: FULL
OD_FINDINGS: FULL

## 2025-03-18 ENCOUNTER — DOCTOR'S OFFICE (OUTPATIENT)
Dept: URBAN - METROPOLITAN AREA CLINIC 125 | Facility: CLINIC | Age: 80
Setting detail: OPHTHALMOLOGY
End: 2025-03-18
Payer: MEDICARE

## 2025-03-18 DIAGNOSIS — E11.3411: ICD-10-CM

## 2025-03-18 DIAGNOSIS — E11.3412: ICD-10-CM

## 2025-03-18 PROCEDURE — 92250 FUNDUS PHOTOGRAPHY W/I&R: CPT | Performed by: OPHTHALMOLOGY

## 2025-03-18 PROCEDURE — 67028 INJECTION EYE DRUG: CPT | Mod: LT | Performed by: OPHTHALMOLOGY

## 2025-03-18 PROCEDURE — 92235 FLUORESCEIN ANGRPH MLTIFRAME: CPT | Performed by: OPHTHALMOLOGY

## 2025-03-18 ASSESSMENT — REFRACTION_AUTOREFRACTION
OS_SPHERE: -1.75
OD_CYLINDER: -4.75
OD_SPHERE: -2.75
OD_AXIS: 168
OS_AXIS: 100
OS_CYLINDER: -2.00

## 2025-03-18 ASSESSMENT — KERATOMETRY
OD_K1POWER_DIOPTERS: 42.00
OD_AXISANGLE_DEGREES: 007
OS_AXISANGLE_DEGREES: 013
OS_K2POWER_DIOPTERS: 44.50
OS_K1POWER_DIOPTERS: 43.50
OD_K2POWER_DIOPTERS: 44.50

## 2025-03-18 ASSESSMENT — CONFRONTATIONAL VISUAL FIELD TEST (CVF)
OS_FINDINGS: FULL
OD_FINDINGS: FULL

## 2025-03-18 ASSESSMENT — VISUAL ACUITY
OD_BCVA: 20/20
OS_BCVA: CF@5FT

## 2025-04-02 ENCOUNTER — DOCTOR'S OFFICE (OUTPATIENT)
Dept: URBAN - METROPOLITAN AREA CLINIC 125 | Facility: CLINIC | Age: 80
Setting detail: OPHTHALMOLOGY
End: 2025-04-02
Payer: MEDICARE

## 2025-04-02 DIAGNOSIS — E11.3411: ICD-10-CM

## 2025-04-02 PROCEDURE — 67028 INJECTION EYE DRUG: CPT | Mod: RT | Performed by: OPHTHALMOLOGY

## 2025-04-02 ASSESSMENT — KERATOMETRY
OS_K1POWER_DIOPTERS: 43.50
OD_K1POWER_DIOPTERS: 42.00
OS_AXISANGLE_DEGREES: 013
OS_K2POWER_DIOPTERS: 44.50
OD_K2POWER_DIOPTERS: 44.50
OD_AXISANGLE_DEGREES: 007

## 2025-04-02 ASSESSMENT — VISUAL ACUITY
OD_BCVA: 20/20
OS_BCVA: CF 4FT

## 2025-04-02 ASSESSMENT — REFRACTION_AUTOREFRACTION
OD_CYLINDER: -4.75
OD_SPHERE: -2.75
OS_SPHERE: -1.75
OS_CYLINDER: -2.00
OD_AXIS: 168
OS_AXIS: 100

## 2025-05-09 ENCOUNTER — DOCTOR'S OFFICE (OUTPATIENT)
Dept: URBAN - METROPOLITAN AREA CLINIC 125 | Facility: CLINIC | Age: 80
Setting detail: OPHTHALMOLOGY
End: 2025-05-09
Payer: MEDICARE

## 2025-05-09 DIAGNOSIS — H43.11: ICD-10-CM

## 2025-05-09 DIAGNOSIS — H33.021: ICD-10-CM

## 2025-05-09 DIAGNOSIS — E11.3412: ICD-10-CM

## 2025-05-09 DIAGNOSIS — E11.3411: ICD-10-CM

## 2025-05-09 PROCEDURE — 99213 OFFICE O/P EST LOW 20 MIN: CPT | Performed by: OPHTHALMOLOGY

## 2025-05-09 PROCEDURE — 92134 CPTRZ OPH DX IMG PST SGM RTA: CPT | Performed by: OPHTHALMOLOGY

## 2025-05-09 ASSESSMENT — KERATOMETRY
OS_K2POWER_DIOPTERS: 44.50
OD_K1POWER_DIOPTERS: 42.00
OD_AXISANGLE_DEGREES: 007
OS_AXISANGLE_DEGREES: 013
OS_K1POWER_DIOPTERS: 43.50
OD_K2POWER_DIOPTERS: 44.50

## 2025-05-09 ASSESSMENT — REFRACTION_AUTOREFRACTION
OS_AXIS: 100
OD_SPHERE: -2.75
OS_SPHERE: -1.75
OD_CYLINDER: -4.75
OD_AXIS: 168
OS_CYLINDER: -2.00

## 2025-05-09 ASSESSMENT — SUPERFICIAL PUNCTATE KERATITIS (SPK)
OD_SPK: 1+
OS_SPK: 1+

## 2025-05-09 ASSESSMENT — CONFRONTATIONAL VISUAL FIELD TEST (CVF)
OD_FINDINGS: FULL
OS_FINDINGS: FULL

## 2025-05-09 ASSESSMENT — VISUAL ACUITY
OS_BCVA: CF 4FT
OD_BCVA: 20/20-2

## 2025-05-23 ENCOUNTER — DOCTOR'S OFFICE (OUTPATIENT)
Dept: URBAN - METROPOLITAN AREA CLINIC 125 | Facility: CLINIC | Age: 80
Setting detail: OPHTHALMOLOGY
End: 2025-05-23
Payer: MEDICARE

## 2025-05-23 DIAGNOSIS — E11.3411: ICD-10-CM

## 2025-05-23 PROCEDURE — 67028 INJECTION EYE DRUG: CPT | Mod: RT | Performed by: OPHTHALMOLOGY

## 2025-05-23 ASSESSMENT — REFRACTION_AUTOREFRACTION
OD_AXIS: 168
OS_SPHERE: -1.75
OD_SPHERE: -2.75
OD_CYLINDER: -4.75
OS_AXIS: 100
OS_CYLINDER: -2.00

## 2025-05-23 ASSESSMENT — KERATOMETRY
OS_AXISANGLE_DEGREES: 013
OS_K2POWER_DIOPTERS: 44.50
OD_K2POWER_DIOPTERS: 44.50
OD_AXISANGLE_DEGREES: 007
OD_K1POWER_DIOPTERS: 42.00
OS_K1POWER_DIOPTERS: 43.50

## 2025-05-23 ASSESSMENT — VISUAL ACUITY
OS_BCVA: CF 4FT
OD_BCVA: 20/20-2

## 2025-05-28 ENCOUNTER — DOCTOR'S OFFICE (OUTPATIENT)
Dept: URBAN - METROPOLITAN AREA CLINIC 125 | Facility: CLINIC | Age: 80
Setting detail: OPHTHALMOLOGY
End: 2025-05-28
Payer: MEDICARE

## 2025-05-28 DIAGNOSIS — E11.3412: ICD-10-CM

## 2025-05-28 PROCEDURE — 67028 INJECTION EYE DRUG: CPT | Mod: LT | Performed by: OPHTHALMOLOGY

## 2025-05-28 ASSESSMENT — KERATOMETRY
OD_AXISANGLE_DEGREES: 007
OS_K1POWER_DIOPTERS: 43.50
OS_K2POWER_DIOPTERS: 44.50
OS_AXISANGLE_DEGREES: 013
OD_K1POWER_DIOPTERS: 42.00
OD_K2POWER_DIOPTERS: 44.50

## 2025-05-28 ASSESSMENT — REFRACTION_AUTOREFRACTION
OD_SPHERE: -2.75
OD_AXIS: 168
OS_CYLINDER: -2.00
OS_AXIS: 100
OS_SPHERE: -1.75
OD_CYLINDER: -4.75

## 2025-05-28 ASSESSMENT — VISUAL ACUITY
OS_BCVA: CF 4FT
OD_BCVA: 20/20-1

## 2025-06-13 ENCOUNTER — DOCTOR'S OFFICE (OUTPATIENT)
Dept: URBAN - METROPOLITAN AREA CLINIC 125 | Facility: CLINIC | Age: 80
Setting detail: OPHTHALMOLOGY
End: 2025-06-13
Payer: MEDICARE

## 2025-06-13 DIAGNOSIS — E11.3412: ICD-10-CM

## 2025-06-13 DIAGNOSIS — H43.11: ICD-10-CM

## 2025-06-13 DIAGNOSIS — H33.021: ICD-10-CM

## 2025-06-13 DIAGNOSIS — E11.3411: ICD-10-CM

## 2025-06-13 PROCEDURE — 99214 OFFICE O/P EST MOD 30 MIN: CPT | Performed by: OPHTHALMOLOGY

## 2025-06-13 PROCEDURE — 92134 CPTRZ OPH DX IMG PST SGM RTA: CPT | Performed by: OPHTHALMOLOGY

## 2025-06-13 PROCEDURE — 92202 OPSCPY EXTND ON/MAC DRAW: CPT | Performed by: OPHTHALMOLOGY

## 2025-06-13 ASSESSMENT — KERATOMETRY
OS_AXISANGLE_DEGREES: 013
OS_K1POWER_DIOPTERS: 43.50
OD_K1POWER_DIOPTERS: 42.00
OD_AXISANGLE_DEGREES: 007
OS_K2POWER_DIOPTERS: 44.50
OD_K2POWER_DIOPTERS: 44.50

## 2025-06-13 ASSESSMENT — REFRACTION_AUTOREFRACTION
OD_SPHERE: -2.75
OD_CYLINDER: -4.75
OS_CYLINDER: -2.00
OD_AXIS: 168
OS_AXIS: 100
OS_SPHERE: -1.75

## 2025-06-13 ASSESSMENT — CONFRONTATIONAL VISUAL FIELD TEST (CVF)
OS_FINDINGS: FULL
OD_FINDINGS: FULL

## 2025-06-13 ASSESSMENT — SUPERFICIAL PUNCTATE KERATITIS (SPK)
OS_SPK: 1+
OD_SPK: 1+

## 2025-06-13 ASSESSMENT — VISUAL ACUITY
OD_BCVA: 20/20-2
OS_BCVA: CF 4FT

## 2025-06-27 ENCOUNTER — DOCTOR'S OFFICE (OUTPATIENT)
Dept: URBAN - METROPOLITAN AREA CLINIC 125 | Facility: CLINIC | Age: 80
Setting detail: OPHTHALMOLOGY
End: 2025-06-27
Payer: MEDICARE

## 2025-06-27 DIAGNOSIS — H43.11: ICD-10-CM

## 2025-06-27 DIAGNOSIS — H33.021: ICD-10-CM

## 2025-06-27 DIAGNOSIS — E11.3412: ICD-10-CM

## 2025-06-27 DIAGNOSIS — E11.3411: ICD-10-CM

## 2025-06-27 PROCEDURE — 92134 CPTRZ OPH DX IMG PST SGM RTA: CPT | Performed by: OPHTHALMOLOGY

## 2025-06-27 PROCEDURE — 99213 OFFICE O/P EST LOW 20 MIN: CPT | Performed by: OPHTHALMOLOGY

## 2025-06-27 ASSESSMENT — REFRACTION_AUTOREFRACTION
OD_SPHERE: -2.75
OD_CYLINDER: -4.75
OS_CYLINDER: -2.00
OS_SPHERE: -1.75
OD_AXIS: 168
OS_AXIS: 100

## 2025-06-27 ASSESSMENT — KERATOMETRY
OS_AXISANGLE_DEGREES: 013
OS_K1POWER_DIOPTERS: 43.50
OS_K2POWER_DIOPTERS: 44.50
OD_AXISANGLE_DEGREES: 007
OD_K1POWER_DIOPTERS: 42.00
OD_K2POWER_DIOPTERS: 44.50

## 2025-06-27 ASSESSMENT — VISUAL ACUITY
OS_BCVA: CF 4FT
OD_BCVA: 20/25+2

## 2025-06-27 ASSESSMENT — SUPERFICIAL PUNCTATE KERATITIS (SPK)
OD_SPK: 1+
OS_SPK: 1+

## 2025-07-25 ENCOUNTER — DOCTOR'S OFFICE (OUTPATIENT)
Dept: URBAN - METROPOLITAN AREA CLINIC 125 | Facility: CLINIC | Age: 80
Setting detail: OPHTHALMOLOGY
End: 2025-07-25
Payer: MEDICARE

## 2025-07-25 DIAGNOSIS — E11.3511: ICD-10-CM

## 2025-07-25 DIAGNOSIS — E11.3412: ICD-10-CM

## 2025-07-25 DIAGNOSIS — H33.021: ICD-10-CM

## 2025-07-25 DIAGNOSIS — H04.122: ICD-10-CM

## 2025-07-25 DIAGNOSIS — H43.11: ICD-10-CM

## 2025-07-25 DIAGNOSIS — H43.812: ICD-10-CM

## 2025-07-25 DIAGNOSIS — H04.121: ICD-10-CM

## 2025-07-25 PROCEDURE — 92250 FUNDUS PHOTOGRAPHY W/I&R: CPT | Performed by: OPHTHALMOLOGY

## 2025-07-25 PROCEDURE — 92235 FLUORESCEIN ANGRPH MLTIFRAME: CPT | Performed by: OPHTHALMOLOGY

## 2025-07-25 PROCEDURE — 99213 OFFICE O/P EST LOW 20 MIN: CPT | Performed by: OPHTHALMOLOGY

## 2025-07-25 ASSESSMENT — SUPERFICIAL PUNCTATE KERATITIS (SPK)
OS_SPK: 1+
OD_SPK: 1+

## 2025-07-25 ASSESSMENT — KERATOMETRY
OD_K2POWER_DIOPTERS: 44.50
OS_K2POWER_DIOPTERS: 44.50
OD_K1POWER_DIOPTERS: 42.00
OS_AXISANGLE_DEGREES: 013
OD_AXISANGLE_DEGREES: 007
OS_K1POWER_DIOPTERS: 43.50

## 2025-07-25 ASSESSMENT — CONFRONTATIONAL VISUAL FIELD TEST (CVF)
OS_FINDINGS: FULL
OD_FINDINGS: FULL

## 2025-07-25 ASSESSMENT — REFRACTION_AUTOREFRACTION
OS_CYLINDER: -2.00
OD_SPHERE: -2.75
OS_AXIS: 100
OD_AXIS: 168
OD_CYLINDER: -4.75
OS_SPHERE: -1.75

## 2025-07-25 ASSESSMENT — VISUAL ACUITY
OS_BCVA: CF 4FT
OD_BCVA: 20/25

## 2025-08-06 ENCOUNTER — DOCTOR'S OFFICE (OUTPATIENT)
Dept: URBAN - METROPOLITAN AREA CLINIC 125 | Facility: CLINIC | Age: 80
Setting detail: OPHTHALMOLOGY
End: 2025-08-06
Payer: MEDICARE

## 2025-08-06 DIAGNOSIS — E11.3511: ICD-10-CM

## 2025-08-06 PROCEDURE — 67028 INJECTION EYE DRUG: CPT | Mod: RT | Performed by: OPHTHALMOLOGY

## 2025-08-06 ASSESSMENT — KERATOMETRY
OD_AXISANGLE_DEGREES: 007
OS_K1POWER_DIOPTERS: 43.50
OS_K2POWER_DIOPTERS: 44.50
OS_AXISANGLE_DEGREES: 013
OD_K2POWER_DIOPTERS: 44.50
OD_K1POWER_DIOPTERS: 42.00

## 2025-08-06 ASSESSMENT — REFRACTION_AUTOREFRACTION
OD_CYLINDER: -4.75
OS_AXIS: 100
OS_CYLINDER: -2.00
OS_SPHERE: -1.75
OD_SPHERE: -2.75
OD_AXIS: 168

## 2025-08-06 ASSESSMENT — VISUAL ACUITY
OD_BCVA: 20/25
OS_BCVA: 20/200-1

## 2025-08-20 ENCOUNTER — DOCTOR'S OFFICE (OUTPATIENT)
Dept: URBAN - METROPOLITAN AREA CLINIC 125 | Facility: CLINIC | Age: 80
Setting detail: OPHTHALMOLOGY
End: 2025-08-20
Payer: MEDICARE

## 2025-08-20 DIAGNOSIS — E11.3412: ICD-10-CM

## 2025-08-20 PROCEDURE — 67028 INJECTION EYE DRUG: CPT | Mod: LT | Performed by: OPHTHALMOLOGY

## 2025-08-20 ASSESSMENT — REFRACTION_AUTOREFRACTION
OS_CYLINDER: -2.00
OD_CYLINDER: -4.75
OD_SPHERE: -2.75
OD_AXIS: 168
OS_SPHERE: -1.75
OS_AXIS: 100

## 2025-08-20 ASSESSMENT — VISUAL ACUITY
OD_BCVA: 20/25-2
OS_BCVA: 20/200-1

## 2025-08-20 ASSESSMENT — KERATOMETRY
OD_K1POWER_DIOPTERS: 42.00
OD_AXISANGLE_DEGREES: 007
OS_AXISANGLE_DEGREES: 013
OS_K1POWER_DIOPTERS: 43.50
OD_K2POWER_DIOPTERS: 44.50
OS_K2POWER_DIOPTERS: 44.50